# Patient Record
Sex: FEMALE | Race: WHITE | Employment: OTHER | RURAL
[De-identification: names, ages, dates, MRNs, and addresses within clinical notes are randomized per-mention and may not be internally consistent; named-entity substitution may affect disease eponyms.]

---

## 2019-11-11 ENCOUNTER — OFFICE VISIT (OUTPATIENT)
Dept: SURGERY | Age: 64
End: 2019-11-11

## 2019-11-11 VITALS
TEMPERATURE: 98.4 F | SYSTOLIC BLOOD PRESSURE: 156 MMHG | BODY MASS INDEX: 46.22 KG/M2 | RESPIRATION RATE: 18 BRPM | OXYGEN SATURATION: 95 % | DIASTOLIC BLOOD PRESSURE: 86 MMHG | WEIGHT: 251.2 LBS | HEART RATE: 114 BPM | HEIGHT: 62 IN

## 2019-11-11 DIAGNOSIS — K62.5 HEMORRHAGE OF RECTUM AND ANUS: Primary | ICD-10-CM

## 2019-11-11 DIAGNOSIS — K59.00 CONSTIPATION, UNSPECIFIED CONSTIPATION TYPE: ICD-10-CM

## 2019-11-11 NOTE — PROGRESS NOTES
1. Have you been to the ER, urgent care clinic since your last visit? Hospitalized since your last visit? No    2. Have you seen or consulted any other health care providers outside of the [address] [place] [address] since your last visit? Include any pap smears or colon screening.  Yes When: april perez, dr Job Hearn pcp

## 2019-11-11 NOTE — PATIENT INSTRUCTIONS
Learning About Colonoscopy  What is a colonoscopy? A colonoscopy is a test (also called a procedure) that lets a doctor look inside your large intestine. The doctor uses a thin, lighted tube called a colonoscope. The doctor uses it to look for small growths called polyps, colon or rectal cancer (colorectal cancer), or other problems like bleeding. During the procedure, the doctor can take samples of tissue. The samples can then be checked for cancer or other conditions. The doctor can also take out polyps. How is a colonoscopy done? This procedure is done in a doctor's office or a clinic or hospital. You will get medicine to help you relax and not feel pain. Some people find that they do not remember having the test because of the medicine. The doctor gently moves the colonoscope, or scope, through the colon. The scope is also a small video camera. It lets the doctor see the colon and take pictures. A colonoscopy usually takes 30 to 45 minutes. It may take longer if the doctor has to remove polyps. How do you prepare for the procedure? You need to clean out your colon before the procedure so the doctor can see all of your colon. You may start the cleaning process a day or two before the test. This depends on which \"colon prep\" your doctor recommends. To clean your colon, you stop eating solid foods and drink only clear liquids. You can have water, tea, coffee, clear juices, clear broths, flavored ice pops, and gelatin (such as Jell-O). Do not drink anything red or purple, such as grape juice or fruit punch. And do not eat red or purple foods, such as grape ice pops or cherry gelatin. The day or night before the procedure, you drink a large amount of a special liquid. This causes loose, frequent stools. You will go to the bathroom a lot. It is very important to drink all of the colon prep liquid. If you have problems drinking the liquid, call your doctor.   For many people, the prep is worse than the test. It may be uncomfortable, and you may feel hungry on the clear liquid diet. Some people do not go to work or do their usual activities on the day of the prep. Arrange to have someone take you home after the test.  What can you expect after a colonoscopy? The nurses will watch you for 1 to 2 hours until the medicines wear off. Then you can go home. You will need a ride. Your doctor will tell you when you can eat and do your usual activities. Your doctor will talk to you about when you will need your next colonoscopy. The results of your test and your risk for colorectal cancer will help your doctor decide how often you need to be checked. Follow-up care is a key part of your treatment and safety. Be sure to make and go to all appointments, and call your doctor if you are having problems. It's also a good idea to know your test results and keep a list of the medicines you take. Where can you learn more? Go to http://bubba-kelby.info/. Enter G191 in the search box to learn more about \"Learning About Colonoscopy. \"  Current as of: December 19, 2018  Content Version: 12.2  © 6489-8568 [employer]. Care instructions adapted under license by [employer] (which disclaims liability or warranty for this information). If you have questions about a medical condition or this instruction, always ask your healthcare professional. [address] any warranty or liability for your use of this information.

## 2019-11-11 NOTE — PROGRESS NOTES
Shona Otto is a 62 y.o. female who presents today with the following:  Chief Complaint   Patient presents with    Constipation       HPI    72-year-old female who presents to us as a self-referral whose primary care physician is Dr. Chico Amin for worsening chronic constipation and bright red blood per rectum which is been present for approximately 1-1/2 months. In terms of the constipation she states that this is been present over her whole life but is gotten worse over the last 4 to 6 months. She does take a generic stool softener but has not tried any other treatment. She denies any weight change or family history of colon cancer. She had her last colonoscopy in 2011 at which point they found 2 polyps. She denies any abdominal pain. She states that the blood that she notices when she wipes on the toilet tissue and it is bright red with some mild discomfort. She has had a bilateral tubal ligation and a liver biopsy for a liver problem that she had in 1996 that she states has resolved. She is also had bilateral carpal tunnel surgery and left foot surgery with hardware. She does take an 81 mg aspirin a day.   Past Medical History:   Diagnosis Date    Asthma     Hepatitis     auto immune    Menopause     SVT (supraventricular tachycardia) (HCC)        Past Surgical History:   Procedure Laterality Date    HX BREAST BIOPSY Left 2/2009    abscessed tissue    HX CARPAL TUNNEL RELEASE Bilateral     HX COLONOSCOPY  12/2011    adenomatous polyps x 2    HX ORTHOPAEDIC Left 2006    foot       Social History     Socioeconomic History    Marital status:      Spouse name: Not on file    Number of children: Not on file    Years of education: Not on file    Highest education level: Not on file   Occupational History    Not on file   Social Needs    Financial resource strain: Not on file    Food insecurity:     Worry: Not on file     Inability: Not on file    Transportation needs: Medical: Not on file     Non-medical: Not on file   Tobacco Use    Smoking status: Never Smoker    Smokeless tobacco: Never Used   Substance and Sexual Activity    Alcohol use: No    Drug use: No    Sexual activity: Not on file   Lifestyle    Physical activity:     Days per week: Not on file     Minutes per session: Not on file    Stress: Not on file   Relationships    Social connections:     Talks on phone: Not on file     Gets together: Not on file     Attends Anabaptism service: Not on file     Active member of club or organization: Not on file     Attends meetings of clubs or organizations: Not on file     Relationship status: Not on file    Intimate partner violence:     Fear of current or ex partner: Not on file     Emotionally abused: Not on file     Physically abused: Not on file     Forced sexual activity: Not on file   Other Topics Concern    Not on file   Social History Narrative    Not on file       Family History   Problem Relation Age of Onset    Cancer Mother         lung    Cancer Sister         lung       No Known Allergies    Current Outpatient Medications   Medication Sig    dilTIAZem ER (CARDIZEM LA) 240 mg Tb24 tablet Take 1 Tab by mouth daily.  RANITIDINE HCL (ZANTAC PO) Take  by mouth.  ALBUTEROL IN Take  by inhalation.  FLUTICASONE/SALMETEROL (ADVAIR DISKUS IN) Take  by inhalation.  ALPRAZOLAM (XANAX PO) Take  by mouth.  HYDROCODONE/ACETAMINOPHEN (LORTAB PO) Take  by mouth.  ASPIRIN (ELIZABETH LOW STRENGTH PO) Take  by mouth. No current facility-administered medications for this visit. The above histories, medications and allergies have been reviewed. Review of Systems   Respiratory: Positive for wheezing. Gastrointestinal: Positive for blood in stool and constipation. Musculoskeletal: Positive for back pain, joint pain and neck pain. Endo/Heme/Allergies: Bruises/bleeds easily. Psychiatric/Behavioral: The patient is nervous/anxious. Visit Vitals  /86 (BP 1 Location: Left arm, BP Patient Position: Sitting)   Pulse (!) 114   Temp 98.4 °F (36.9 °C) (Oral)   Resp 18   Ht 5' 2\" (1.575 m)   Wt 251 lb 3.2 oz (113.9 kg)   SpO2 95%   BMI 45.95 kg/m²     Physical Exam   Constitutional:   Morbidly obese   Cardiovascular: Normal rate, regular rhythm and normal heart sounds. Pulmonary/Chest: Effort normal. No respiratory distress. She has wheezes. Abdominal: She exhibits no distension and no mass. There is no tenderness. There is no rebound and no guarding. Morbidly obese       1. Hemorrhage of rectum and anus  Recommend colonoscopy. The procedure was explained in detail including the risks and benefits. Risks shared included risks of missed lesions, incomplete exam, colon injury or perforation. Alternative treatments discussed included barium enema. Risks associated with anesthesia were also discussed. The patient wishes to proceed and we will schedule. 2. Constipation, unspecified constipation type  See #1      Follow-up and Dispositions    · Return for post procedure.          Ethel Gates MD

## 2020-07-01 ENCOUNTER — NURSE TRIAGE (OUTPATIENT)
Dept: OTHER | Facility: CLINIC | Age: 65
End: 2020-07-01

## 2020-07-01 NOTE — TELEPHONE ENCOUNTER
Patient's location of employment: [place], [place]  Location of injury: [address]   Time of injury: 0640  Last 4 of patient's SSN: [id number]  [facility]    The wheels on a cart were sticking out and the toe of her shoe caught the wheel and she fell on left knee and left shoulder. She has a bruise on her knee , she did put ice pack on it and denies swelling. The pain is 5/10. Her shoulder pain is getting worse than knee. Her pain is 7/10. When she goes to  something, she feels her arm slightly gives out on her. She has been applying ice. She is still at work. She has full ROM. She has filled out [employer], and notified supervisor. Email: [email or web address],  She will have someone access her work e mail to [email or web address] to her.       Reason for Disposition   High-risk adult (e.g., age > 61, osteoporosis, chronic steroid use)   High-risk adult (e.g., age > 61, osteoporosis, chronic steroid use)    Protocols used: KNEE INJURY-ADULT-OH, SHOULDER INJURY-ADULT-OH

## 2020-07-07 PROBLEM — E66.01 OBESITY, MORBID (HCC): Status: ACTIVE | Noted: 2020-07-07

## 2020-09-18 ENCOUNTER — EMPLOYEE WELLNESS (OUTPATIENT)
Dept: OTHER | Facility: CLINIC | Age: 65
End: 2020-09-18

## 2020-09-19 LAB
CHOLEST SERPL-MCNC: 181 MG/DL
GLUCOSE SERPL-MCNC: 95 MG/DL (ref 65–100)
HDLC SERPL-MCNC: 55 MG/DL
LDLC SERPL CALC-MCNC: 110.6 MG/DL (ref 0–100)
TRIGL SERPL-MCNC: 77 MG/DL (ref ?–150)

## 2021-12-10 ENCOUNTER — HOSPITAL ENCOUNTER (OUTPATIENT)
Dept: MAMMOGRAPHY | Age: 66
Discharge: HOME | End: 2021-12-10
Attending: INTERNAL MEDICINE
Payer: MEDICARE

## 2021-12-10 ENCOUNTER — HOSPITAL ENCOUNTER (OUTPATIENT)
Dept: GENERAL RADIOLOGY | Age: 66
Discharge: HOME | End: 2021-12-10
Attending: INTERNAL MEDICINE
Payer: MEDICARE

## 2021-12-10 VITALS — WEIGHT: 250 LBS | BODY MASS INDEX: 45.73 KG/M2

## 2021-12-10 DIAGNOSIS — Z12.31 ENCOUNTER FOR SCREENING MAMMOGRAM FOR MALIGNANT NEOPLASM OF BREAST: ICD-10-CM

## 2021-12-10 DIAGNOSIS — Z78.0 ASYMPTOMATIC MENOPAUSAL STATE: ICD-10-CM

## 2021-12-10 PROCEDURE — 77063 BREAST TOMOSYNTHESIS BI: CPT

## 2021-12-10 PROCEDURE — 77080 DXA BONE DENSITY AXIAL: CPT

## 2022-03-19 PROBLEM — E66.01 OBESITY, MORBID (HCC): Status: ACTIVE | Noted: 2020-07-07

## 2022-06-28 ENCOUNTER — TRANSCRIBE ORDER (OUTPATIENT)
Dept: REGISTRATION | Age: 67
End: 2022-06-28

## 2022-06-28 ENCOUNTER — HOSPITAL ENCOUNTER (OUTPATIENT)
Dept: GENERAL RADIOLOGY | Age: 67
Discharge: HOME | End: 2022-06-28
Payer: MEDICARE

## 2022-06-28 DIAGNOSIS — M25.569 PAIN IN UNSPECIFIED KNEE: Primary | ICD-10-CM

## 2022-06-28 DIAGNOSIS — M25.569 PAIN IN UNSPECIFIED KNEE: ICD-10-CM

## 2022-06-28 PROCEDURE — 73564 X-RAY EXAM KNEE 4 OR MORE: CPT

## 2022-09-22 ENCOUNTER — APPOINTMENT (OUTPATIENT)
Dept: CT IMAGING | Age: 67
End: 2022-09-22
Attending: EMERGENCY MEDICINE
Payer: MEDICARE

## 2022-09-22 ENCOUNTER — HOSPITAL ENCOUNTER (EMERGENCY)
Age: 67
Discharge: ANOTHER HOSPITAL | End: 2022-09-23
Attending: EMERGENCY MEDICINE
Payer: MEDICARE

## 2022-09-22 DIAGNOSIS — A41.9 SEPSIS, DUE TO UNSPECIFIED ORGANISM, UNSPECIFIED WHETHER ACUTE ORGAN DYSFUNCTION PRESENT (HCC): ICD-10-CM

## 2022-09-22 DIAGNOSIS — K81.9 CHOLECYSTITIS: Primary | ICD-10-CM

## 2022-09-22 LAB
ALBUMIN SERPL-MCNC: 3.3 G/DL (ref 3.5–5)
ALBUMIN/GLOB SERPL: 0.8 {RATIO} (ref 1.1–2.2)
ALP SERPL-CCNC: 83 U/L (ref 45–117)
ALT SERPL-CCNC: 27 U/L (ref 12–78)
ANION GAP SERPL CALC-SCNC: 9 MMOL/L (ref 5–15)
AST SERPL-CCNC: 25 U/L (ref 15–37)
BASOPHILS # BLD: 0 K/UL (ref 0–0.1)
BASOPHILS NFR BLD: 0 % (ref 0–1)
BILIRUB SERPL-MCNC: 2 MG/DL (ref 0.2–1)
BUN SERPL-MCNC: 10 MG/DL (ref 6–20)
BUN/CREAT SERPL: 13 (ref 12–20)
CALCIUM SERPL-MCNC: 9.2 MG/DL (ref 8.5–10.1)
CHLORIDE SERPL-SCNC: 100 MMOL/L (ref 97–108)
CO2 SERPL-SCNC: 25 MMOL/L (ref 21–32)
CREAT SERPL-MCNC: 0.8 MG/DL (ref 0.55–1.02)
DIFFERENTIAL METHOD BLD: ABNORMAL
EOSINOPHIL # BLD: 0 K/UL (ref 0–0.4)
EOSINOPHIL NFR BLD: 0 % (ref 0–7)
ERYTHROCYTE [DISTWIDTH] IN BLOOD BY AUTOMATED COUNT: 18.5 % (ref 11.5–14.5)
GLOBULIN SER CALC-MCNC: 4 G/DL (ref 2–4)
GLUCOSE SERPL-MCNC: 114 MG/DL (ref 65–100)
HCT VFR BLD AUTO: 33.6 % (ref 35–47)
HGB BLD-MCNC: 10.2 G/DL (ref 11.5–16)
IMM GRANULOCYTES # BLD AUTO: 0 K/UL (ref 0–0.04)
IMM GRANULOCYTES NFR BLD AUTO: 0 % (ref 0–0.5)
INR PPP: 1.1 (ref 0.9–1.1)
LACTATE SERPL-SCNC: 1.5 MMOL/L (ref 0.4–2)
LIPASE SERPL-CCNC: 21 U/L (ref 73–393)
LYMPHOCYTES # BLD: 0.3 K/UL (ref 0.8–3.5)
LYMPHOCYTES NFR BLD: 2 % (ref 12–49)
MCH RBC QN AUTO: 23.4 PG (ref 26–34)
MCHC RBC AUTO-ENTMCNC: 30.4 G/DL (ref 30–36.5)
MCV RBC AUTO: 77.2 FL (ref 80–99)
MONOCYTES # BLD: 0.3 K/UL (ref 0–1)
MONOCYTES NFR BLD: 2 % (ref 5–13)
NEUTS BAND NFR BLD MANUAL: 3 %
NEUTS SEG # BLD: 13.4 K/UL (ref 1.8–8)
NEUTS SEG NFR BLD: 93 % (ref 32–75)
NRBC # BLD: 0.02 K/UL (ref 0–0.01)
NRBC BLD-RTO: 0.1 PER 100 WBC
PLATELET # BLD AUTO: 380 K/UL (ref 150–400)
PLATELET COMMENTS,PCOM: ABNORMAL
PMV BLD AUTO: 9.7 FL (ref 8.9–12.9)
POTASSIUM SERPL-SCNC: 4.1 MMOL/L (ref 3.5–5.1)
PROT SERPL-MCNC: 7.3 G/DL (ref 6.4–8.2)
PROTHROMBIN TIME: 11.1 SEC (ref 9–11.1)
RBC # BLD AUTO: 4.35 M/UL (ref 3.8–5.2)
RBC MORPH BLD: ABNORMAL
SODIUM SERPL-SCNC: 134 MMOL/L (ref 136–145)
WBC # BLD AUTO: 14 K/UL (ref 3.6–11)

## 2022-09-22 PROCEDURE — 74011000258 HC RX REV CODE- 258: Performed by: EMERGENCY MEDICINE

## 2022-09-22 PROCEDURE — 74011250636 HC RX REV CODE- 250/636: Performed by: EMERGENCY MEDICINE

## 2022-09-22 PROCEDURE — 74177 CT ABD & PELVIS W/CONTRAST: CPT

## 2022-09-22 PROCEDURE — 36415 COLL VENOUS BLD VENIPUNCTURE: CPT

## 2022-09-22 PROCEDURE — 74011000636 HC RX REV CODE- 636: Performed by: EMERGENCY MEDICINE

## 2022-09-22 PROCEDURE — 96361 HYDRATE IV INFUSION ADD-ON: CPT

## 2022-09-22 PROCEDURE — 85610 PROTHROMBIN TIME: CPT

## 2022-09-22 PROCEDURE — 96375 TX/PRO/DX INJ NEW DRUG ADDON: CPT

## 2022-09-22 PROCEDURE — 96365 THER/PROPH/DIAG IV INF INIT: CPT

## 2022-09-22 PROCEDURE — 85025 COMPLETE CBC W/AUTO DIFF WBC: CPT

## 2022-09-22 PROCEDURE — 83690 ASSAY OF LIPASE: CPT

## 2022-09-22 PROCEDURE — 83605 ASSAY OF LACTIC ACID: CPT

## 2022-09-22 PROCEDURE — 80053 COMPREHEN METABOLIC PANEL: CPT

## 2022-09-22 PROCEDURE — 99285 EMERGENCY DEPT VISIT HI MDM: CPT

## 2022-09-22 RX ORDER — MORPHINE SULFATE 4 MG/ML
4 INJECTION INTRAVENOUS
Status: COMPLETED | OUTPATIENT
Start: 2022-09-22 | End: 2022-09-22

## 2022-09-22 RX ORDER — ONDANSETRON 2 MG/ML
4 INJECTION INTRAMUSCULAR; INTRAVENOUS
Status: COMPLETED | OUTPATIENT
Start: 2022-09-22 | End: 2022-09-22

## 2022-09-22 RX ADMIN — SODIUM CHLORIDE 1000 ML: 9 INJECTION, SOLUTION INTRAVENOUS at 20:21

## 2022-09-22 RX ADMIN — MORPHINE SULFATE 4 MG: 4 INJECTION INTRAVENOUS at 20:23

## 2022-09-22 RX ADMIN — IOPAMIDOL 100 ML: 612 INJECTION, SOLUTION INTRAVENOUS at 19:54

## 2022-09-22 RX ADMIN — PIPERACILLIN AND TAZOBACTAM 3.38 G: 3; .375 INJECTION, POWDER, FOR SOLUTION INTRAVENOUS at 20:55

## 2022-09-22 RX ADMIN — ONDANSETRON 4 MG: 2 INJECTION INTRAMUSCULAR; INTRAVENOUS at 20:22

## 2022-09-22 NOTE — ED TRIAGE NOTES
Pt arrived by POV with abd pain. Per pt she complains of abd pain that started on Tuesday pt reports normal BM no N/V/D, pt reports her pain is intermittent rating at 4/10. Pt is awake alert and oriented X 4.  Pt educated on ER flow

## 2022-09-23 ENCOUNTER — HOSPITAL ENCOUNTER (INPATIENT)
Age: 67
LOS: 4 days | Discharge: HOME | DRG: 418 | End: 2022-09-27
Attending: FAMILY MEDICINE | Admitting: FAMILY MEDICINE
Payer: MEDICARE

## 2022-09-23 ENCOUNTER — APPOINTMENT (OUTPATIENT)
Dept: ULTRASOUND IMAGING | Age: 67
DRG: 418 | End: 2022-09-23
Attending: FAMILY MEDICINE
Payer: MEDICARE

## 2022-09-23 VITALS
TEMPERATURE: 99 F | SYSTOLIC BLOOD PRESSURE: 145 MMHG | OXYGEN SATURATION: 95 % | BODY MASS INDEX: 47.84 KG/M2 | HEIGHT: 62 IN | RESPIRATION RATE: 16 BRPM | HEART RATE: 104 BPM | DIASTOLIC BLOOD PRESSURE: 74 MMHG | WEIGHT: 260 LBS

## 2022-09-23 PROBLEM — K81.9 CHOLECYSTITIS: Status: ACTIVE | Noted: 2022-09-23

## 2022-09-23 LAB
ALBUMIN SERPL-MCNC: 2.8 G/DL (ref 3.5–5)
ALBUMIN/GLOB SERPL: 0.7 {RATIO} (ref 1.1–2.2)
ALP SERPL-CCNC: 78 U/L (ref 45–117)
ALT SERPL-CCNC: 23 U/L (ref 12–78)
ANION GAP SERPL CALC-SCNC: 8 MMOL/L (ref 5–15)
APPEARANCE UR: CLEAR
AST SERPL-CCNC: 21 U/L (ref 15–37)
BACTERIA URNS QL MICRO: ABNORMAL /HPF
BILIRUB DIRECT SERPL-MCNC: 0.5 MG/DL (ref 0–0.2)
BILIRUB SERPL-MCNC: 1.9 MG/DL (ref 0.2–1)
BILIRUB UR QL: NEGATIVE
BUN SERPL-MCNC: 11 MG/DL (ref 6–20)
BUN/CREAT SERPL: 14 (ref 12–20)
CALCIUM SERPL-MCNC: 8.4 MG/DL (ref 8.5–10.1)
CHLORIDE SERPL-SCNC: 103 MMOL/L (ref 97–108)
CO2 SERPL-SCNC: 25 MMOL/L (ref 21–32)
COLOR UR: ABNORMAL
CREAT SERPL-MCNC: 0.81 MG/DL (ref 0.55–1.02)
EPITH CASTS URNS QL MICRO: ABNORMAL /LPF
ERYTHROCYTE [DISTWIDTH] IN BLOOD BY AUTOMATED COUNT: 18.6 % (ref 11.5–14.5)
GLOBULIN SER CALC-MCNC: 3.8 G/DL (ref 2–4)
GLUCOSE SERPL-MCNC: 100 MG/DL (ref 65–100)
GLUCOSE UR STRIP.AUTO-MCNC: NEGATIVE MG/DL
HCT VFR BLD AUTO: 30.4 % (ref 35–47)
HGB BLD-MCNC: 9 G/DL (ref 11.5–16)
HGB UR QL STRIP: ABNORMAL
KETONES UR QL STRIP.AUTO: NEGATIVE MG/DL
LEUKOCYTE ESTERASE UR QL STRIP.AUTO: NEGATIVE
MCH RBC QN AUTO: 23.2 PG (ref 26–34)
MCHC RBC AUTO-ENTMCNC: 29.6 G/DL (ref 30–36.5)
MCV RBC AUTO: 78.4 FL (ref 80–99)
NITRITE UR QL STRIP.AUTO: NEGATIVE
NRBC # BLD: 0 K/UL (ref 0–0.01)
NRBC BLD-RTO: 0 PER 100 WBC
PH UR STRIP: 6 [PH] (ref 5–8)
PLATELET # BLD AUTO: 348 K/UL (ref 150–400)
PMV BLD AUTO: 9.6 FL (ref 8.9–12.9)
POTASSIUM SERPL-SCNC: 3.9 MMOL/L (ref 3.5–5.1)
PROT SERPL-MCNC: 6.6 G/DL (ref 6.4–8.2)
PROT UR STRIP-MCNC: ABNORMAL MG/DL
RBC # BLD AUTO: 3.88 M/UL (ref 3.8–5.2)
RBC #/AREA URNS HPF: ABNORMAL /HPF (ref 0–5)
SODIUM SERPL-SCNC: 136 MMOL/L (ref 136–145)
SP GR UR REFRACTOMETRY: 1.02 (ref 1–1.03)
UROBILINOGEN UR QL STRIP.AUTO: 1 EU/DL (ref 0.2–1)
WBC # BLD AUTO: 13 K/UL (ref 3.6–11)
WBC URNS QL MICRO: ABNORMAL /HPF (ref 0–4)

## 2022-09-23 PROCEDURE — 36415 COLL VENOUS BLD VENIPUNCTURE: CPT

## 2022-09-23 PROCEDURE — 82248 BILIRUBIN DIRECT: CPT

## 2022-09-23 PROCEDURE — 96361 HYDRATE IV INFUSION ADD-ON: CPT

## 2022-09-23 PROCEDURE — 74011250636 HC RX REV CODE- 250/636: Performed by: FAMILY MEDICINE

## 2022-09-23 PROCEDURE — 76705 ECHO EXAM OF ABDOMEN: CPT

## 2022-09-23 PROCEDURE — 94640 AIRWAY INHALATION TREATMENT: CPT

## 2022-09-23 PROCEDURE — 80053 COMPREHEN METABOLIC PANEL: CPT

## 2022-09-23 PROCEDURE — 74011000258 HC RX REV CODE- 258: Performed by: EMERGENCY MEDICINE

## 2022-09-23 PROCEDURE — 99221 1ST HOSP IP/OBS SF/LOW 40: CPT | Performed by: NURSE PRACTITIONER

## 2022-09-23 PROCEDURE — 74011250636 HC RX REV CODE- 250/636: Performed by: EMERGENCY MEDICINE

## 2022-09-23 PROCEDURE — 81001 URINALYSIS AUTO W/SCOPE: CPT

## 2022-09-23 PROCEDURE — 96366 THER/PROPH/DIAG IV INF ADDON: CPT

## 2022-09-23 PROCEDURE — 74011000250 HC RX REV CODE- 250: Performed by: FAMILY MEDICINE

## 2022-09-23 PROCEDURE — 94664 DEMO&/EVAL PT USE INHALER: CPT

## 2022-09-23 PROCEDURE — 65270000029 HC RM PRIVATE

## 2022-09-23 PROCEDURE — 85027 COMPLETE CBC AUTOMATED: CPT

## 2022-09-23 RX ORDER — HEPARIN SODIUM 5000 [USP'U]/ML
5000 INJECTION, SOLUTION INTRAVENOUS; SUBCUTANEOUS EVERY 8 HOURS
Status: DISCONTINUED | OUTPATIENT
Start: 2022-09-24 | End: 2022-09-27 | Stop reason: HOSPADM

## 2022-09-23 RX ORDER — METRONIDAZOLE 500 MG/100ML
500 INJECTION, SOLUTION INTRAVENOUS EVERY 12 HOURS
Status: DISCONTINUED | OUTPATIENT
Start: 2022-09-23 | End: 2022-09-27 | Stop reason: HOSPADM

## 2022-09-23 RX ORDER — BUDESONIDE 0.25 MG/2ML
500 INHALANT ORAL
Status: DISCONTINUED | OUTPATIENT
Start: 2022-09-23 | End: 2022-09-27 | Stop reason: HOSPADM

## 2022-09-23 RX ORDER — ACETAMINOPHEN 325 MG/1
650 TABLET ORAL
Status: DISCONTINUED | OUTPATIENT
Start: 2022-09-23 | End: 2022-09-23

## 2022-09-23 RX ORDER — METRONIDAZOLE 500 MG/100ML
500 INJECTION, SOLUTION INTRAVENOUS EVERY 8 HOURS
Status: DISCONTINUED | OUTPATIENT
Start: 2022-09-23 | End: 2022-09-23

## 2022-09-23 RX ORDER — DEXTROSE, SODIUM CHLORIDE, AND POTASSIUM CHLORIDE 5; .45; .22 G/100ML; G/100ML; G/100ML
INJECTION INTRAVENOUS CONTINUOUS
Status: DISPENSED | OUTPATIENT
Start: 2022-09-23 | End: 2022-09-24

## 2022-09-23 RX ORDER — SODIUM CHLORIDE, SODIUM LACTATE, POTASSIUM CHLORIDE, CALCIUM CHLORIDE 600; 310; 30; 20 MG/100ML; MG/100ML; MG/100ML; MG/100ML
125 INJECTION, SOLUTION INTRAVENOUS CONTINUOUS
Status: DISCONTINUED | OUTPATIENT
Start: 2022-09-23 | End: 2022-09-23 | Stop reason: HOSPADM

## 2022-09-23 RX ORDER — ARFORMOTEROL TARTRATE 15 UG/2ML
15 SOLUTION RESPIRATORY (INHALATION)
Status: DISCONTINUED | OUTPATIENT
Start: 2022-09-23 | End: 2022-09-27 | Stop reason: HOSPADM

## 2022-09-23 RX ORDER — MORPHINE SULFATE 2 MG/ML
2 INJECTION, SOLUTION INTRAMUSCULAR; INTRAVENOUS
Status: DISCONTINUED | OUTPATIENT
Start: 2022-09-23 | End: 2022-09-25

## 2022-09-23 RX ORDER — HYDROCODONE BITARTRATE AND ACETAMINOPHEN 5; 325 MG/1; MG/1
1 TABLET ORAL
Status: DISCONTINUED | OUTPATIENT
Start: 2022-09-23 | End: 2022-09-25

## 2022-09-23 RX ORDER — ALPRAZOLAM 0.5 MG/1
0.5 TABLET ORAL
Status: DISCONTINUED | OUTPATIENT
Start: 2022-09-23 | End: 2022-09-27 | Stop reason: HOSPADM

## 2022-09-23 RX ADMIN — ARFORMOTEROL TARTRATE 15 MCG: 15 SOLUTION RESPIRATORY (INHALATION) at 19:23

## 2022-09-23 RX ADMIN — PIPERACILLIN AND TAZOBACTAM 3.38 G: 3; .375 INJECTION, POWDER, FOR SOLUTION INTRAVENOUS at 09:03

## 2022-09-23 RX ADMIN — SODIUM CHLORIDE, POTASSIUM CHLORIDE, SODIUM LACTATE AND CALCIUM CHLORIDE 125 ML/HR: 600; 310; 30; 20 INJECTION, SOLUTION INTRAVENOUS at 06:12

## 2022-09-23 RX ADMIN — POTASSIUM CHLORIDE, DEXTROSE MONOHYDRATE AND SODIUM CHLORIDE: 224; 5; 450 INJECTION, SOLUTION INTRAVENOUS at 15:23

## 2022-09-23 RX ADMIN — CEFTRIAXONE SODIUM 1 G: 1 INJECTION, POWDER, FOR SOLUTION INTRAMUSCULAR; INTRAVENOUS at 15:24

## 2022-09-23 RX ADMIN — METRONIDAZOLE 500 MG: 500 INJECTION, SOLUTION INTRAVENOUS at 15:23

## 2022-09-23 RX ADMIN — PIPERACILLIN AND TAZOBACTAM 3.38 G: 3; .375 INJECTION, POWDER, FOR SOLUTION INTRAVENOUS at 04:26

## 2022-09-23 RX ADMIN — BUDESONIDE 500 MCG: 0.25 INHALANT RESPIRATORY (INHALATION) at 19:23

## 2022-09-23 NOTE — CONSULTS
[facility]  Inpatient Consultation        Admit Date: 9/23/2022  Reason for Consultation: cholecystitis    HPI:  Ted De Jesus is a 78 y.o. female w/ hx of cirrhosis d/t autoimmune hepatitis since the 90s whom we are asked to see in consultation by Dr. Noris Perdue for the above complaint. Pt presented to an OSH [facility]) w/ c/o RUQ pain that started 3 days ago. She thought it was gas so took pepto bismol w/o a lot of relief. Denies n/v/d. No f/c. Wbc 13K, T bili 1.9, LFTs are WNL    CT scan:  1. Cirrhosis is suspected. No hepatic mass, intrahepatic or ureteral dilation or  extrahepatic bile duct dilation. 2. At least 2 gallstones in the gallbladder neck, the larger measuring 18 mm. Gallbladder mildly distended. Patient Active Problem List    Diagnosis Date Noted    Cholecystitis 09/23/2022    Obesity, morbid ([facility] [address] 07/07/2020    History of colonic polyps 11/26/2013     Past Medical History:   Diagnosis Date    Asthma     Hepatitis     auto immune    Menopause     SVT (supraventricular tachycardia) ([facility] [address]       Past Surgical History:   Procedure Laterality Date    COLONOSCOPY N/A 12/12/2019    COLONOSCOPY performed by Matthew Powers MD at [facility]    HX BREAST BIOPSY Left 2/2009    abscessed tissue    HX CARPAL TUNNEL RELEASE Bilateral     HX CATARACT REMOVAL Bilateral     HX COLONOSCOPY  12/2011    adenomatous polyps x 2    HX ORTHOPAEDIC Left 2006    foot      Social History     Tobacco Use    Smoking status: Never    Smokeless tobacco: Never   Substance Use Topics    Alcohol use: No      Family History   Problem Relation Age of Onset    Cancer Mother         lung    Cancer Sister         lung      Prior to Admission medications    Medication Sig Start Date End Date Taking? Authorizing Provider   fluticasone propionate (Flovent Diskus) 100 mcg/actuation dsdv Take  by inhalation.     Provider, Historical   cyclobenzaprine (FLEXERIL) 10 mg tablet TAKE 1 TABLET BY MOUTH 3 TIMES A DAY AS NEEDED 19   Provider, Historical   predniSONE (DELTASONE) 10 mg tablet TAKE 2 TABLETS BY MOUTH EVERY DAY AS NEEDED 10/3/19   Provider, Historical   ALBUTEROL IN Take  by inhalation. Provider, Historical   FLUTICASONE/SALMETEROL (ADVAIR DISKUS IN) Take  by inhalation. Provider, Historical   ALPRAZOLAM (XANAX PO) Take  by mouth. Provider, Historical   HYDROCODONE/ACETAMINOPHEN (LORTAB PO) Take  by mouth. Provider, Historical   ASPIRIN (ELIZABETH LOW STRENGTH PO) Take  by mouth. Provider, Historical     Current Facility-Administered Medications   Medication Dose Route Frequency    cefTRIAXone (ROCEPHIN) 1 g in 0.9% sodium chloride 10 mL IV syringe  1 g IntraVENous Q24H    morphine injection 2 mg  2 mg IntraVENous Q4H PRN    dextrose 5% - 0.45% NaCl with KCl 30 mEq/L infusion   IntraVENous CONTINUOUS    [START ON 2022] heparin (porcine) injection 5,000 Units  5,000 Units SubCUTAneous Q8H    metroNIDAZOLE (FLAGYL) IVPB premix 500 mg  500 mg IntraVENous Q12H    ALPRAZolam (XANAX) tablet 0.5 mg  0.5 mg Oral BID PRN    HYDROcodone-acetaminophen (NORCO) 5-325 mg per tablet 1 Tablet  1 Tablet Oral Q6H PRN    arformoteroL (BROVANA) neb solution 15 mcg  15 mcg Nebulization BID RT    And    budesonide (PULMICORT) 250 mcg/2ml nebulizer susp  500 mcg Nebulization BID RT     No Known Allergies       Subjective:     Review of Systems:    A comprehensive review of systems was negative except for that written in the History of Present Illness. Objective:     Blood pressure (!) 145/95, pulse 99, temperature 98.8 °F (37.1 °C), resp. rate 14, SpO2 95 %.   Temp (24hrs), Av.8 °F (37.1 °C), Min:98.4 °F (36.9 °C), Max:99 °F (37.2 °C)      Recent Labs     22  0500 22  1840   WBC 13.0* 14.0*   HGB 9.0* 10.2*   HCT 30.4* 33.6*    380     Recent Labs     22  0500 22  1840    134*   K 3.9 4.1    100   CO2 25 25    114*   BUN 11 10   CREA 0.81 0.80   CA 8.4* 9. 2   ALB 2.8* 3.3*   TBILI 1.9* 2.0*   ALT 23 27   INR  --  1.1     Recent Labs     09/22/22  1840   LPSE 21*       No intake or output data in the 24 hours ending 09/23/22 1533    _____________________  Physical Exam:     General:  Alert, cooperative, no distress, appears stated age. Eyes:   Sclera clear. Throat: Lips, mucosa, and tongue normal.   Neck: Supple, symmetrical, trachea midline. Lungs:   Clear to auscultation bilaterally. Heart:  Regular rate and rhythm. Abdomen:   Normal BS, obese, TTP RUQ, No masses,  No organomegaly. Extremities: Extremities normal, atraumatic, no cyanosis or edema. Skin: Skin color, texture, turgor normal. No rashes or lesions. Assessment:   Active Problems:    Cholecystitis (9/23/2022)            Plan:     Lap marta sometime this weekend - Dr Cinda Amezquita or Dr Candi Diane  Will check direct bili - to make sure elevation is d/t hepatitis  May have clear liquids  Pain mgmt    Thank you for allowing us to participate in the care of this patient. Total time spent with patient: 30 minutes. Signed By: Danelle Jones NP     September 23, 2022        ADMIT NOTE    Attending addendum:  I was available to supervise the APC. I have reviewed the APC's note  We discussed the plan of care. 71-year-old female with right upper quadrant pain and a history of autoimmune hepatitis no longer on therapy.     OBJECTIVE:  Visit Vitals  BP (!) 145/95   Pulse 99   Temp 98.8 °F (37.1 °C)   Resp 14   SpO2 95%     General: No acute distress, conversant  Eyes: PERRLA, no scleral icterus  HENT: Normocephalic without oral lesions  Neck: Trachea midline without LAD  Cardiac: Normal pulse rate and rhythm  Pulmonary: Symmetric chest rise with normal effort  GI: Soft, tender in right upper quadrant, obese, ND, no hernia, no splenomegaly  Skin: Warm without rash  Extremities: No edema or joint stiffness  Psych: Appropriate mood and affect    ASSESSMENT / Plan:    I agree with the APC's assessment and plan with the following additions/modifications:    61-year-old female with right upper quadrant pain and gallstones with gallbladder distention but no wall thickening or surrounding fluid. White count elevated. Direct bilirubin low. Appears to be indirect dominated. Thirsty. We will give clear liquid diet. I will hold off on MRI given the low likelihood this is a CBD stone. Recommend IOC if we proceed with removal of her gallbladder. We will check an acute hepatitis panel tonight. LFTs otherwise normal.  May need her gallbladder out in the next day or 2. Continue antibiotics.       Signed By: Carolyn Foreman MD  Bariatric and General Surgeon  [facility]    September 23, 2022

## 2022-09-23 NOTE — H&P
[facility] Adult  Hospitalist Group  History and Physical    Date of Service:  9/23/2022  Primary Care Provider: Lucille Shook MD  Source of information: The patient    Chief Complaint: No chief complaint on file. History of Presenting Illness:   Gabriella Perez is a 78 y.o. female who presents with abdominal pain. Patient reports abdominal pain is started on Tuesday evening. Moderate pain. She thought it was a gas, took over-the-counter medication no improvement. Next days feeling the same went to emergency room, had CT scan of abdomen diagnosed with gallstone disease. She was advised needs cholecystectomy. Transferred here for further evaluation and treatment. Patient reports in 1996 was diagnosed with cirrhosis, had elevated liver enzyme but normalized. No history of EtOH. Upon arrival in the emergency room CT scan of abdomen showed gallbladder with wall thickness, likely cirrhosis and gallstone. Patient reports taking medication for asthma anxiety she has osteoarthritis taking Lortab. Taking verapamil 120 mg for rapid heart rate. The patient denies any headache, blurry vision, sore throat, trouble swallowing, trouble with speech, chest pain, SOB, cough, fever, chills, N/V/D, abd pain, urinary symptoms, constipation, recent travels, sick contacts, focal or generalized neurological symptoms, falls, injuries, rashes, contact with COVID-19 diagnosed patients, hematemesis, melena, hemoptysis, hematuria, rashes, denies starting any new medications and denies any other concerns or problems besides as mentioned above. REVIEW OF SYSTEMS:  A comprehensive review of systems was negative except for that written in the History of Present Illness.      Past Medical History:   Diagnosis Date    Asthma     Hepatitis     auto immune    Menopause     SVT (supraventricular tachycardia) ([facility] [address]       Past Surgical History:   Procedure Laterality Date    COLONOSCOPY N/A 12/12/2019 COLONOSCOPY performed by Dalia Vu MD at [address] Left 2/2009    abscessed tissue    HX CARPAL TUNNEL RELEASE Bilateral     HX CATARACT REMOVAL Bilateral     HX COLONOSCOPY  12/2011    adenomatous polyps x 2    HX ORTHOPAEDIC Left 2006    foot     Prior to Admission medications    Medication Sig Start Date End Date Taking? Authorizing Provider   fluticasone propionate (Flovent Diskus) 100 mcg/actuation dsdv Take  by inhalation. Provider, Historical   cyclobenzaprine (FLEXERIL) 10 mg tablet TAKE 1 TABLET BY MOUTH 3 TIMES A DAY AS NEEDED 9/6/19   Provider, Historical   predniSONE (DELTASONE) 10 mg tablet TAKE 2 TABLETS BY MOUTH EVERY DAY AS NEEDED 10/3/19   Provider, Historical   ALBUTEROL IN Take  by inhalation. Provider, Historical   FLUTICASONE/SALMETEROL (ADVAIR DISKUS IN) Take  by inhalation. Provider, Historical   ALPRAZOLAM (XANAX PO) Take  by mouth. Provider, Historical   HYDROCODONE/ACETAMINOPHEN (LORTAB PO) Take  by mouth. Provider, Historical   ASPIRIN (ELIZABETH LOW STRENGTH PO) Take  by mouth. Provider, Historical     No Known Allergies   Family History   Problem Relation Age of Onset    Cancer Mother         lung    Cancer Sister         lung      Social History:  reports that she has never smoked. She has never used smokeless tobacco. She reports that she does not drink alcohol and does not use drugs. Family and social history were personally reviewed, all pertinent and relevant details are outlined as above.   FH Lung cancer sister and mother     Objective:   Visit Vitals  BP (!) 145/95 (BP 1 Location: Right upper arm)   Pulse 100   Temp 98.8 °F (37.1 °C)   Resp 14   SpO2 95%           PHYSICAL EXAM:   General: Alert x oriented x 3, awake, no acute distress, resting in bed, pleasant female appears to be stated age  HEENT: PEERL, EOMI, moist mucus membranes  Neck: Supple, no JVD, no meningeal signs  Chest: Clear to auscultation bilaterally   CVS: RRR, S1 S2 heard, no murmurs/rubs/gallops  Abd: Abdomen soft. Deep tenderness upper abdomen more to the epigastric and right upper quadrant no rebound tenderness. Ext: No clubbing, no cyanosis, no edema  Neuro/Psych: Pleasant mood and affect, CN 2-12 grossly intact, sensory grossly within normal limit, Strength 5/5 in all extremities, DTR 1+ x 4  Cap refill: Brisk, less than 3 seconds  Pulses: 2+, symmetric in all extremities  Skin: Warm, dry, without rashes or lesions    Data Review: All diagnostic labs and studies have been reviewed. Abnormal Labs Reviewed - No data to display    All Micro Results       None            IMAGING:   US ABD LTD    (Results Pending)        ECG/ECHO:    Results for orders placed or performed during the hospital encounter of 05/07/18   EKG, 12 LEAD, INITIAL   Result Value Ref Range    Ventricular Rate 166 BPM    Atrial Rate 208 BPM    QRS Duration 114 ms    Q-T Interval 302 ms    QTC Calculation (Bezet) 501 ms    Calculated R Axis 96 degrees    Calculated T Axis 15 degrees    Diagnosis       Supraventricular tachycardia  Rightward axis  Nonspecific ST abnormality  Abnormal ECG  When compared with ECG of 23-FEB-2007 12:54,  Note tachycardia and rhythm change  ST now depressed in Inferior leads  Confirmed by Jany Guan MD, --- ([id number]) on 5/8/2018 7:01:38 AM          Assessment:   Given the patient's current clinical presentation, there is a high level of concern for decompensation if discharged from the emergency department. Complex decision making was performed, which includes reviewing the patient's available past medical records, laboratory results, and imaging studies. Active Problems:    Cholecystitis (9/23/2022)      Plan:   1. Abdominal pain  2. Gallstone disease  3. Leukocytosis  4. Asthma  5. History of tachyarrhythmia  6. Anxiety  7. Osteoarthritis  Patient admitted for further evaluation and treatment. We will proceed with ultrasound of right upper quadrant. Empiric antibiotic surgical consultation. IV fluid keep NPO. Pain control. 2.  Leukocytosis due to cholecystitis. Monitor  3. Asthma: Not in acute exacerbation. Resume Advair and albuterol  4. Osteoarthritis: Taking Lortab as needed  5. Patient taking verapamil for rapid heart rate      DIET: No diet orders on file   ISOLATION PRECAUTIONS: There are currently no Active Isolations  CODE STATUS: Full Code   DVT PROPHYLAXIS: Heparin  FUNCTIONAL STATUS PRIOR TO HOSPITALIZATION: Fully active and ambulatory; able to carry on all self-care without restriction. EARLY MOBILITY ASSESSMENT: Recommend routine ambulation while hospitalized with the assistance of nursing staff  ANTICIPATED DISCHARGE: 24-48 hours. EMERGENCY CONTACT/SURROGATE DECISION MAKER: P    CRITICAL CARE WAS PERFORMED FOR THIS ENCOUNTER: NO.      Signed By: Helena Meza MD     September 23, 2022         Please note that this dictation may have been completed with Dragon, the [employer] voice recognition software. Quite often unanticipated grammatical, syntax, homophones, and other interpretive errors are inadvertently transcribed by the computer software. Please disregard these errors. Please excuse any errors that have escaped final proofreading.

## 2022-09-23 NOTE — PROGRESS NOTES
Clinical Pharmacy Note: Metronidazole Dosing    Please note that the metronidazole dose for Emmanuelle Valdez has been changed to 500 mg IV q12h per [facility]-approved protocol. Please contact the pharmacy with any questions.     ANTIONE SearsD

## 2022-09-23 NOTE — ED PROVIDER NOTES
EMERGENCY DEPARTMENT HISTORY AND PHYSICAL EXAM      Date: 9/22/2022  Patient Name: Sandra Roman    History of Presenting Illness     Chief Complaint   Patient presents with    Abdominal Pain       History Provided By: Patient    HPI: Sandra Roman, 78 y.o. female with PMHx as noted below presents the emergency department chief complaint of abdominal pain. Patient reported onset of upper abdominal pain approximately 2 days ago. Initially was intermittent, now become constant and more severe. Pain is described as a dull, aching pain that is nonradiating. She also reports some associated nausea. Pt denies any other alleviating or exacerbating factors. Additionally, pt specifically denies any recent fever, chills, headache,  CP, SOB, lightheadedness, dizziness, numbness, weakness, BLE swelling, heart palpitations, urinary sxs, diarrhea, constipation, melena, hematochezia, cough, or congestion. PCP: Joseph NELSON MD    Current Facility-Administered Medications   Medication Dose Route Frequency Provider Last Rate Last Admin    piperacillin-tazobactam (ZOSYN) 3.375 g in 0.9% sodium chloride (MBP/ADV) 100 mL MBP  3.375 g IntraVENous Q6H Fernandez Beth MD   IV Completed at 09/23/22 0456    lactated Ringers infusion  125 mL/hr IntraVENous CONTINUOUS Fernandez Beth MD         Current Outpatient Medications   Medication Sig Dispense Refill    fluticasone propionate (Flovent Diskus) 100 mcg/actuation dsdv Take  by inhalation. cyclobenzaprine (FLEXERIL) 10 mg tablet TAKE 1 TABLET BY MOUTH 3 TIMES A DAY AS NEEDED  1    predniSONE (DELTASONE) 10 mg tablet TAKE 2 TABLETS BY MOUTH EVERY DAY AS NEEDED  3    ALBUTEROL IN Take  by inhalation. FLUTICASONE/SALMETEROL (ADVAIR DISKUS IN) Take  by inhalation. ALPRAZOLAM (XANAX PO) Take  by mouth. HYDROCODONE/ACETAMINOPHEN (LORTAB PO) Take  by mouth. ASPIRIN (ELIZABETH LOW STRENGTH PO) Take  by mouth.          Past History     Past Medical History:  Past Medical History:   Diagnosis Date    Asthma     Hepatitis     auto immune    Menopause     SVT (supraventricular tachycardia) ([facility] [address]        Past Surgical History:  Past Surgical History:   Procedure Laterality Date    COLONOSCOPY N/A 12/12/2019    COLONOSCOPY performed by Jennifer Webber MD at [facility]    HX BREAST BIOPSY Left 2/2009    abscessed tissue    HX CARPAL TUNNEL RELEASE Bilateral     HX CATARACT REMOVAL Bilateral     HX COLONOSCOPY  12/2011    adenomatous polyps x 2    HX ORTHOPAEDIC Left 2006    foot       Family History:  Family History   Problem Relation Age of Onset    Cancer Mother         lung    Cancer Sister         lung       Social History:  Social History     Tobacco Use    Smoking status: Never    Smokeless tobacco: Never   Substance Use Topics    Alcohol use: No    Drug use: No       Allergies:  No Known Allergies      Review of Systems   Review of Systems  Constitutional: Negative for fever, chills, and fatigue. HENT: Negative for congestion, sore throat, rhinorrhea, sneezing and neck stiffness   Eyes: Negative for discharge and redness. Respiratory: Negative for  shortness of breath, wheezing   Cardiovascular: Negative for chest pain, palpitations   Gastrointestinal: Positive for nausea,  abdominal pain. Negative constipation, diarrhea and blood in stool. Genitourinary: Negative for dysuria, urgency, frequency, hematuria, flank pain, decreased urine volume, discharge,   Musculoskeletal: Negative for myalgias or joint pain . Skin: Negative for rash or lesions . Neurological: Negative weakness, light-headedness, numbness and headaches. Physical Exam   Physical Exam    GENERAL: alert and oriented, no acute distress  EYES: PEERL, No injection, discharge or icterus. ENT: Mucous membranes pink and moist.  NECK: Supple  LUNGS: Airway patent. Non-labored respirations. Breath sounds clear with good air entry bilaterally. HEART: Tachycardic, regular rhythm. No peripheral edema  ABDOMEN: Non-distended, epigastric and right upper quadrant tenderness without guarding or rebound. SKIN:  warm, dry  EXTREMITIES: Without swelling, tenderness or deformity, symmetric with normal ROM  NEUROLOGICAL: Alert, oriented      Diagnostic Study Results     Labs -     Recent Results (from the past 12 hour(s))   CBC WITH AUTOMATED DIFF    Collection Time: 09/22/22  6:40 PM   Result Value Ref Range    WBC 14.0 (H) 3.6 - 11.0 K/uL    RBC 4.35 3.80 - 5.20 M/uL    HGB 10.2 (L) 11.5 - 16.0 g/dL    HCT 33.6 (L) 35.0 - 47.0 %    MCV 77.2 (L) 80.0 - 99.0 FL    MCH 23.4 (L) 26.0 - 34.0 PG    MCHC 30.4 30.0 - 36.5 g/dL    RDW 18.5 (H) 11.5 - 14.5 %    PLATELET 213 770 - 892 K/uL    MPV 9.7 8.9 - 12.9 FL    NRBC 0.1 (H) 0  WBC    ABSOLUTE NRBC 0.02 (H) 0.00 - 0.01 K/uL    NEUTROPHILS 93 (H) 32 - 75 %    BAND NEUTROPHILS 3 %    LYMPHOCYTES 2 (L) 12 - 49 %    MONOCYTES 2 (L) 5 - 13 %    EOSINOPHILS 0 0 - 7 %    BASOPHILS 0 0 - 1 %    IMMATURE GRANULOCYTES 0 0.0 - 0.5 %    ABS. NEUTROPHILS 13.4 (H) 1.8 - 8.0 K/UL    ABS. LYMPHOCYTES 0.3 (L) 0.8 - 3.5 K/UL    ABS. MONOCYTES 0.3 0.0 - 1.0 K/UL    ABS. EOSINOPHILS 0.0 0.0 - 0.4 K/UL    ABS. BASOPHILS 0.0 0.0 - 0.1 K/UL    ABS. IMM. GRANS. 0.0 0.00 - 0.04 K/UL    DF MANUAL      PLATELET COMMENTS ADEQUATE PLATELETS      RBC COMMENTS ANISOCYTOSIS  1+       METABOLIC PANEL, COMPREHENSIVE    Collection Time: 09/22/22  6:40 PM   Result Value Ref Range    Sodium 134 (L) 136 - 145 mmol/L    Potassium 4.1 3.5 - 5.1 mmol/L    Chloride 100 97 - 108 mmol/L    CO2 25 21 - 32 mmol/L    Anion gap 9 5 - 15 mmol/L    Glucose 114 (H) 65 - 100 mg/dL    BUN 10 6 - 20 MG/DL    Creatinine 0.80 0.55 - 1.02 MG/DL    BUN/Creatinine ratio 13 12 - 20      GFR est AA >60 >60 ml/min/1.73m2    GFR est non-AA >60 >60 ml/min/1.73m2    Calcium 9.2 8.5 - 10.1 MG/DL    Bilirubin, total 2.0 (H) 0.2 - 1.0 MG/DL    ALT (SGPT) 27 12 - 78 U/L    AST (SGOT) 25 15 - 37 U/L    Alk. phosphatase 83 45 - 117 U/L    Protein, total 7.3 6.4 - 8.2 g/dL    Albumin 3.3 (L) 3.5 - 5.0 g/dL    Globulin 4.0 2.0 - 4.0 g/dL    A-G Ratio 0.8 (L) 1.1 - 2.2     LIPASE    Collection Time: 09/22/22  6:40 PM   Result Value Ref Range    Lipase 21 (L) 73 - 393 U/L   LACTIC ACID    Collection Time: 09/22/22  6:40 PM   Result Value Ref Range    Lactic acid 1.5 0.4 - 2.0 MMOL/L   PROTHROMBIN TIME + INR    Collection Time: 09/22/22  6:40 PM   Result Value Ref Range    INR 1.1 0.9 - 1.1      Prothrombin time 11.1 9.0 - 25.0 sec   METABOLIC PANEL, COMPREHENSIVE    Collection Time: 09/23/22  5:00 AM   Result Value Ref Range    Sodium 136 136 - 145 mmol/L    Potassium 3.9 3.5 - 5.1 mmol/L    Chloride 103 97 - 108 mmol/L    CO2 25 21 - 32 mmol/L    Anion gap 8 5 - 15 mmol/L    Glucose 100 65 - 100 mg/dL    BUN 11 6 - 20 MG/DL    Creatinine 0.81 0.55 - 1.02 MG/DL    BUN/Creatinine ratio 14 12 - 20      GFR est AA >60 >60 ml/min/1.73m2    GFR est non-AA >60 >60 ml/min/1.73m2    Calcium 8.4 (L) 8.5 - 10.1 MG/DL    Bilirubin, total 1.9 (H) 0.2 - 1.0 MG/DL    ALT (SGPT) 23 12 - 78 U/L    AST (SGOT) 21 15 - 37 U/L    Alk.  phosphatase 78 45 - 117 U/L    Protein, total 6.6 6.4 - 8.2 g/dL    Albumin 2.8 (L) 3.5 - 5.0 g/dL    Globulin 3.8 2.0 - 4.0 g/dL    A-G Ratio 0.7 (L) 1.1 - 2.2     CBC W/O DIFF    Collection Time: 09/23/22  5:00 AM   Result Value Ref Range    WBC 13.0 (H) 3.6 - 11.0 K/uL    RBC 3.88 3.80 - 5.20 M/uL    HGB 9.0 (L) 11.5 - 16.0 g/dL    HCT 30.4 (L) 35.0 - 47.0 %    MCV 78.4 (L) 80.0 - 99.0 FL    MCH 23.2 (L) 26.0 - 34.0 PG    MCHC 29.6 (L) 30.0 - 36.5 g/dL    RDW 18.6 (H) 11.5 - 14.5 %    PLATELET 992 746 - 409 K/uL    MPV 9.6 8.9 - 12.9 FL    NRBC 0.0 0  WBC    ABSOLUTE NRBC 0.00 0.00 - 0.01 K/uL   URINALYSIS W/ RFLX MICROSCOPIC    Collection Time: 09/23/22  5:25 AM   Result Value Ref Range    Color YELLOW/STRAW      Appearance CLEAR CLEAR      Specific gravity 1.025 1.003 - 1.030      pH (UA) 6.0 5.0 - 8.0      Protein TRACE (A) NEG mg/dL    Glucose Negative NEG mg/dL    Ketone Negative NEG mg/dL    Bilirubin Negative NEG      Blood TRACE (A) NEG      Urobilinogen 1.0 0.2 - 1.0 EU/dL    Nitrites Negative NEG      Leukocyte Esterase Negative NEG     URINE MICROSCOPIC ONLY    Collection Time: 09/23/22  5:25 AM   Result Value Ref Range    WBC 0-4 0 - 4 /hpf    RBC 5-10 0 - 5 /hpf    Epithelial cells MODERATE (A) FEW /lpf    Bacteria 1+ (A) NEG /hpf       Radiologic Studies -   CT ABD PELV W CONT   Final Result      1. Cirrhosis is suspected. No hepatic mass, intrahepatic or ureteral dilation or   extrahepatic bile duct dilation. 2. At least 2 gallstones in the gallbladder neck, the larger measuring 18 mm. Gallbladder mildly distended. CT Results  (Last 48 hours)                 09/22/22 1955  CT ABD PELV W CONT Final result    Impression:      1. Cirrhosis is suspected. No hepatic mass, intrahepatic or ureteral dilation or   extrahepatic bile duct dilation. 2. At least 2 gallstones in the gallbladder neck, the larger measuring 18 mm. Gallbladder mildly distended. Narrative:  EXAM: CT ABD PELV W CONT       INDICATION: abd pain, leukocystosis       COMPARISON: None        CONTRAST: 100 mL of Isovue-300. ORAL CONTRAST: None. The lack of oral contrast material diminishes the capacity   of CT to evaluate the bowel and adjacent structures. TECHNIQUE:    Following the uneventful intravenous administration of contrast, thin axial   images were obtained through the abdomen and pelvis. Coronal and sagittal   reconstructions were generated. CT dose reduction was achieved through use of a   standardized protocol tailored for this examination and automatic exposure   control for dose modulation. Note that imaging is suboptimal due to artifacts arising from the the anterior   subcutaneous fat due to the patient being larger than the imaging gantry.        FINDINGS:    LOWER THORAX: Medial segment right middle lobe scarring versus atelectasis. LIVER: There is a somewhat lobular contour of the liver with length of 18 cm. Findings suggest cirrhosis. No hepatic mass or intrahepatic biliary ductal   dilation is shown. BILIARY TREE: Mildly distended. There are at least 2 gallstones in the   gallbladder neck, the larger measuring 18 mm. No extrahepatic bile duct dilation   is shown. SPLEEN: within normal limits. PANCREAS: No mass or ductal dilatation. ADRENALS: Unremarkable. KIDNEYS: No mass, calculus, or hydronephrosis. STOMACH: Small to moderate hiatal hernia. SMALL BOWEL: No dilatation or wall thickening. COLON: No dilatation or wall thickening. APPENDIX: Normal.   PERITONEUM: No ascites or pneumoperitoneum. RETROPERITONEUM: No lymphadenopathy or aortic aneurysm. REPRODUCTIVE ORGANS: Normal.   URINARY BLADDER: No mass or calculus. BONES: No destructive bone lesion. ABDOMINAL WALL: No mass or hernia. ADDITIONAL COMMENTS: N/A                 CXR Results  (Last 48 hours)      None              Medical Decision Making     ILynn MD am the first provider for this patient and am the attending of record for this patient encounter. I reviewed the vital signs, available nursing notes, past medical history, past surgical history, family history and social history. Vital Signs-Reviewed the patient's vital signs. Patient Vitals for the past 12 hrs:   Pulse Resp BP SpO2   09/23/22 0430 92 16 132/66 98 %   09/22/22 2038 89 16 (!) 144/67 99 %   09/22/22 2019 94 18 (!) 143/63 99 %           Records Reviewed: Nursing Notes and Old Medical Records    Provider Notes (Medical Decision Making): On presentation, the patient is well appearing, however was mildly tachycardic on arrival, right upper quadrant pain. Labs are notable for leukocytosis, mild elevation of bilirubin.   CT scan showed a distended gallbladder, several gallstones in the gallbladder neck, no bile duct dilation. Concern for possible early cholecystitis. Consulted with our general surgeon Dr. Peter Ferrara who felt the patient should be transferred as she may ultimately require an ERCP. We will plan to transfer patient per his recommendations. ED Course:   Initial assessment performed. The patients presenting problems have been discussed, and they are in agreement with the care plan formulated and outlined with them. I have encouraged them to ask questions as they arise throughout their visit. ED Course as of 09/23/22 0616   Fri Sep 23, 2022   0111 Notified by [facility] that they will be unable to  patient prior to 10 AM tomorrow morning [BN]      ED Course User Index  [BN] Nilsa Bah MD       PROGRESS:  The patient has been re-evaluated and sx have improved. Reviewed available results with patient and have counseled them on diagnosis and care plan. They have expressed understanding, and all their questions have been answered. They agree with plan for admission. CONSULT:  Megan Rocha MD spoke with Dr. Ramon Sequeira, general surgery. Discussed HPI and PE, available diagnostic tests and clinical findings. He agrees with plan for hospitalist admission, will consult on patient. Admit Note  Patient is being admitted to the hospitalist.  The results of their tests and reasons for their admission have been discussed with them and/or available family. They convey agreement and understanding for the need to be admitted and for their admission diagnosis. Consultation has been made with the inpatient physician specialist for hospitalization. Disposition:  admission    PLAN:  1. Admit     Diagnosis     Clinical Impression:   1. Cholecystitis    2. Sepsis, due to unspecified organism, unspecified whether acute organ dysfunction present [facility])        Please note that this dictation was completed with Dragon, computer voice recognition software.   Quite often unanticipated grammatical, syntax, homophones, and other interpretive errors are inadvertently transcribed by the computer software. Please disregard these errors. Additionally, please excuse any errors that have escaped final proofreading.

## 2022-09-23 NOTE — PROGRESS NOTES
4769 - Dr. Tyson Issa advised to arranged BLS transportation from [facility] 8 to [facility].   Arranged [facility] BLS transport w/[facility].    4389 - received room assignment [facility] #[id number]) - updated [facility] of room assignement, confirmed BLS transportation - ETA of 1000

## 2022-09-23 NOTE — ED NOTES
TRANSFER - OUT REPORT:    Verbal report given to [facility] AT [facility] RN(name) on Trell Blackburn  being transferred to [place] [phone](unit) for routine progression of care       Report consisted of patients Situation, Background, Assessment and   Recommendations(SBAR). Information from the following report(s) SBAR, ED Summary, Recent Results and Med Rec Status was reviewed with the receiving nurse. Lines:   Peripheral IV 09/22/22 Right Antecubital (Active)   Site Assessment Clean, dry, & intact 09/22/22 1842   Phlebitis Assessment 0 09/22/22 1842   Infiltration Assessment 0 09/22/22 1842   Dressing Status Clean, dry, & intact 09/22/22 1842   Dressing Type Transparent 09/22/22 1842        Opportunity for questions and clarification was provided.       Patient transported with:  125ml/hr LR

## 2022-09-24 LAB
ALBUMIN SERPL-MCNC: 2.8 G/DL (ref 3.5–5)
ALBUMIN/GLOB SERPL: 0.7 {RATIO} (ref 1.1–2.2)
ALP SERPL-CCNC: 88 U/L (ref 45–117)
ALT SERPL-CCNC: 25 U/L (ref 12–78)
ANION GAP SERPL CALC-SCNC: 7 MMOL/L (ref 5–15)
AST SERPL-CCNC: 15 U/L (ref 15–37)
BASOPHILS # BLD: 0 K/UL (ref 0–0.1)
BASOPHILS NFR BLD: 0 % (ref 0–1)
BILIRUB SERPL-MCNC: 1.3 MG/DL (ref 0.2–1)
BUN SERPL-MCNC: 10 MG/DL (ref 6–20)
BUN/CREAT SERPL: 13 (ref 12–20)
CALCIUM SERPL-MCNC: 8.5 MG/DL (ref 8.5–10.1)
CHLORIDE SERPL-SCNC: 104 MMOL/L (ref 97–108)
CO2 SERPL-SCNC: 25 MMOL/L (ref 21–32)
CREAT SERPL-MCNC: 0.78 MG/DL (ref 0.55–1.02)
DIFFERENTIAL METHOD BLD: ABNORMAL
EOSINOPHIL # BLD: 0 K/UL (ref 0–0.4)
EOSINOPHIL NFR BLD: 0 % (ref 0–7)
ERYTHROCYTE [DISTWIDTH] IN BLOOD BY AUTOMATED COUNT: 18.1 % (ref 11.5–14.5)
GLOBULIN SER CALC-MCNC: 4.1 G/DL (ref 2–4)
GLUCOSE SERPL-MCNC: 89 MG/DL (ref 65–100)
HAV IGM SER QL: NONREACTIVE
HBV CORE IGM SER QL: NONREACTIVE
HBV SURFACE AG SER QL: <0.1 INDEX
HBV SURFACE AG SER QL: NEGATIVE
HCT VFR BLD AUTO: 29.9 % (ref 35–47)
HCV AB SERPL QL IA: NONREACTIVE
HGB BLD-MCNC: 8.8 G/DL (ref 11.5–16)
IMM GRANULOCYTES # BLD AUTO: 0 K/UL (ref 0–0.04)
IMM GRANULOCYTES NFR BLD AUTO: 0 % (ref 0–0.5)
LYMPHOCYTES # BLD: 1 K/UL (ref 0.8–3.5)
LYMPHOCYTES NFR BLD: 11 % (ref 12–49)
MCH RBC QN AUTO: 23.3 PG (ref 26–34)
MCHC RBC AUTO-ENTMCNC: 29.4 G/DL (ref 30–36.5)
MCV RBC AUTO: 79.1 FL (ref 80–99)
MONOCYTES # BLD: 0.4 K/UL (ref 0–1)
MONOCYTES NFR BLD: 5 % (ref 5–13)
NEUTS SEG # BLD: 7.7 K/UL (ref 1.8–8)
NEUTS SEG NFR BLD: 84 % (ref 32–75)
NRBC # BLD: 0 K/UL (ref 0–0.01)
NRBC BLD-RTO: 0 PER 100 WBC
PLATELET # BLD AUTO: 325 K/UL (ref 150–400)
PMV BLD AUTO: 9.5 FL (ref 8.9–12.9)
POTASSIUM SERPL-SCNC: 3.3 MMOL/L (ref 3.5–5.1)
PROT SERPL-MCNC: 6.9 G/DL (ref 6.4–8.2)
RBC # BLD AUTO: 3.78 M/UL (ref 3.8–5.2)
SODIUM SERPL-SCNC: 136 MMOL/L (ref 136–145)
SP1: NORMAL
SP2: NORMAL
SP3: NORMAL
WBC # BLD AUTO: 9.1 K/UL (ref 3.6–11)

## 2022-09-24 PROCEDURE — 65270000029 HC RM PRIVATE

## 2022-09-24 PROCEDURE — 94664 DEMO&/EVAL PT USE INHALER: CPT

## 2022-09-24 PROCEDURE — 94640 AIRWAY INHALATION TREATMENT: CPT

## 2022-09-24 PROCEDURE — 85025 COMPLETE CBC W/AUTO DIFF WBC: CPT

## 2022-09-24 PROCEDURE — 80053 COMPREHEN METABOLIC PANEL: CPT

## 2022-09-24 PROCEDURE — 74011000250 HC RX REV CODE- 250: Performed by: FAMILY MEDICINE

## 2022-09-24 PROCEDURE — 74011250636 HC RX REV CODE- 250/636: Performed by: FAMILY MEDICINE

## 2022-09-24 PROCEDURE — 77010033678 HC OXYGEN DAILY

## 2022-09-24 PROCEDURE — 99231 SBSQ HOSP IP/OBS SF/LOW 25: CPT | Performed by: SURGERY

## 2022-09-24 PROCEDURE — 80074 ACUTE HEPATITIS PANEL: CPT

## 2022-09-24 PROCEDURE — 36415 COLL VENOUS BLD VENIPUNCTURE: CPT

## 2022-09-24 PROCEDURE — 74011250636 HC RX REV CODE- 250/636: Performed by: INTERNAL MEDICINE

## 2022-09-24 RX ORDER — DEXTROSE, SODIUM CHLORIDE, AND POTASSIUM CHLORIDE 5; .45; .22 G/100ML; G/100ML; G/100ML
INJECTION INTRAVENOUS CONTINUOUS
Status: DISPENSED | OUTPATIENT
Start: 2022-09-24 | End: 2022-09-25

## 2022-09-24 RX ORDER — INDOCYANINE GREEN AND WATER 25 MG
1.5 KIT INJECTION ONCE
Status: COMPLETED | OUTPATIENT
Start: 2022-09-25 | End: 2022-09-25

## 2022-09-24 RX ORDER — VERAPAMIL HYDROCHLORIDE 120 MG/1
120 TABLET, FILM COATED, EXTENDED RELEASE ORAL DAILY
Status: DISCONTINUED | OUTPATIENT
Start: 2022-09-25 | End: 2022-09-27 | Stop reason: HOSPADM

## 2022-09-24 RX ORDER — VERAPAMIL HYDROCHLORIDE 120 MG/1
120 TABLET, FILM COATED, EXTENDED RELEASE ORAL DAILY
COMMUNITY
Start: 2022-08-26

## 2022-09-24 RX ADMIN — POTASSIUM CHLORIDE, DEXTROSE MONOHYDRATE AND SODIUM CHLORIDE: 224; 5; 450 INJECTION, SOLUTION INTRAVENOUS at 09:33

## 2022-09-24 RX ADMIN — HEPARIN SODIUM 5000 UNITS: 5000 INJECTION INTRAVENOUS; SUBCUTANEOUS at 17:30

## 2022-09-24 RX ADMIN — POTASSIUM CHLORIDE, DEXTROSE MONOHYDRATE AND SODIUM CHLORIDE: 224; 5; 450 INJECTION, SOLUTION INTRAVENOUS at 10:26

## 2022-09-24 RX ADMIN — ARFORMOTEROL TARTRATE 15 MCG: 15 SOLUTION RESPIRATORY (INHALATION) at 08:00

## 2022-09-24 RX ADMIN — CEFTRIAXONE SODIUM 1 G: 1 INJECTION, POWDER, FOR SOLUTION INTRAMUSCULAR; INTRAVENOUS at 14:26

## 2022-09-24 RX ADMIN — BUDESONIDE 250 MCG: 0.25 INHALANT RESPIRATORY (INHALATION) at 23:19

## 2022-09-24 RX ADMIN — POTASSIUM CHLORIDE, DEXTROSE MONOHYDRATE AND SODIUM CHLORIDE: 224; 5; 450 INJECTION, SOLUTION INTRAVENOUS at 11:21

## 2022-09-24 RX ADMIN — BUDESONIDE 500 MCG: 0.25 INHALANT RESPIRATORY (INHALATION) at 08:00

## 2022-09-24 RX ADMIN — ARFORMOTEROL TARTRATE 15 MCG: 15 SOLUTION RESPIRATORY (INHALATION) at 23:21

## 2022-09-24 RX ADMIN — METRONIDAZOLE 500 MG: 500 INJECTION, SOLUTION INTRAVENOUS at 14:26

## 2022-09-24 RX ADMIN — HEPARIN SODIUM 5000 UNITS: 5000 INJECTION INTRAVENOUS; SUBCUTANEOUS at 09:33

## 2022-09-24 RX ADMIN — METRONIDAZOLE 500 MG: 500 INJECTION, SOLUTION INTRAVENOUS at 03:02

## 2022-09-24 NOTE — PROGRESS NOTES
Hospitalist Progress Note      Hospital summary: Martha Red is a 78 y.o. female who presents with abdominal pain. Patient reports abdominal pain is started on Tuesday evening. Moderate pain. She thought it was a gas, took over-the-counter medication no improvement. Next days feeling the same went to emergency room, had CT scan of abdomen diagnosed with gallstone disease. She was advised needs cholecystectomy. Transferred here for further evaluation and treatment. Patient reports in 1996 was diagnosed with cirrhosis, had elevated liver enzyme but normalized. No history of EtOH. Upon arrival in the emergency room CT scan of abdomen showed gallbladder with wall thickness, likely cirrhosis and gallstone. Patient reports taking medication for asthma anxiety she has osteoarthritis taking Lortab. Taking verapamil 120 mg for rapid heart rate. 9/23/2022      Assessment/Plan:  Acute Cholecystitis  - Pt presented with abd pain. Leukocytosis on poa - resolved   - CT abd: Cirrhosis is suspected. No hepatic mass, intrahepatic or ureteral dilation or extrahepatic bile duct dilation. At least 2 gallstones in the gallbladder neck, the larger measuring 18 mm. Gallbladder mildly distended  - US abd; Gallbladder distention with intraluminal calculi and a possible 17 mm calculus lodged in the gallbladder neck. Correlate for acute cholecystitis. Common bile duct dilatation without visualized choledocholithiasis. - appreciate Gen surgery input  - CLD today, plan for OR tomorrow   - c/w IV Ceftriaxone and flagyl     Hx Liver cirrhosis - not on any meds   Asthma -stable , c/w brovana/ pulmicort   History of tachyarrhythmia - c/w home verapamil     Code status: Full  DVT prophylaxis: SCDs  Disposition: TBD  ----------------------------------------------    CC: Abd pain     S: Patient is seen and examined at bedside this AM. She feels ok, pain controlled. Tolerated liquid diet.  Waiting for surgery plan for OR  Discussed with nursing and Surgery Dr. Julienne Quinn     Review of Systems:  A comprehensive review of systems was negative. O:  Visit Vitals  BP (!) 153/84   Pulse 74   Temp 99.4 °F (37.4 °C)   Resp 18   SpO2 96%       PHYSICAL EXAM:  Gen: NAD  HEENT: anicteric sclerae, normal conjunctiva, oropharynx clear, MM moist  Neck: supple, trachea midline, no adenopathy  Heart: RRR, no MRG, no JVD, no peripheral edema  Lungs: CTA b/l, non-labored respirations  Abd: soft, RUQ tenderness, ND, BS+, no organomegaly  Extr: warm  Skin: dry, no rash  Neuro: CN II-XII grossly intact, normal speech, moves all extremities  Psych: normal mood, appropriate affect    No intake or output data in the 24 hours ending 09/24/22 1538     Recent labs & imaging reviewed:  Recent Results (from the past 24 hour(s))   BILIRUBIN, DIRECT    Collection Time: 09/23/22  4:58 PM   Result Value Ref Range    Bilirubin, direct 0.5 (H) 0.0 - 0.2 MG/DL   METABOLIC PANEL, COMPREHENSIVE    Collection Time: 09/24/22 12:22 AM   Result Value Ref Range    Sodium 136 136 - 145 mmol/L    Potassium 3.3 (L) 3.5 - 5.1 mmol/L    Chloride 104 97 - 108 mmol/L    CO2 25 21 - 32 mmol/L    Anion gap 7 5 - 15 mmol/L    Glucose 89 65 - 100 mg/dL    BUN 10 6 - 20 MG/DL    Creatinine 0.78 0.55 - 1.02 MG/DL    BUN/Creatinine ratio 13 12 - 20      GFR est AA >60 >60 ml/min/1.73m2    GFR est non-AA >60 >60 ml/min/1.73m2    Calcium 8.5 8.5 - 10.1 MG/DL    Bilirubin, total 1.3 (H) 0.2 - 1.0 MG/DL    ALT (SGPT) 25 12 - 78 U/L    AST (SGOT) 15 15 - 37 U/L    Alk.  phosphatase 88 45 - 117 U/L    Protein, total 6.9 6.4 - 8.2 g/dL    Albumin 2.8 (L) 3.5 - 5.0 g/dL    Globulin 4.1 (H) 2.0 - 4.0 g/dL    A-G Ratio 0.7 (L) 1.1 - 2.2     CBC WITH AUTOMATED DIFF    Collection Time: 09/24/22 12:22 AM   Result Value Ref Range    WBC 9.1 3.6 - 11.0 K/uL    RBC 3.78 (L) 3.80 - 5.20 M/uL    HGB 8.8 (L) 11.5 - 16.0 g/dL    HCT 29.9 (L) 35.0 - 47.0 %    MCV 79.1 (L) 80.0 - 99.0 FL    MCH 23.3 (L) 26.0 - 34.0 PG    MCHC 29.4 (L) 30.0 - 36.5 g/dL    RDW 18.1 (H) 11.5 - 14.5 %    PLATELET 462 382 - 545 K/uL    MPV 9.5 8.9 - 12.9 FL    NRBC 0.0 0  WBC    ABSOLUTE NRBC 0.00 0.00 - 0.01 K/uL    NEUTROPHILS 84 (H) 32 - 75 %    LYMPHOCYTES 11 (L) 12 - 49 %    MONOCYTES 5 5 - 13 %    EOSINOPHILS 0 0 - 7 %    BASOPHILS 0 0 - 1 %    IMMATURE GRANULOCYTES 0 0.0 - 0.5 %    ABS. NEUTROPHILS 7.7 1.8 - 8.0 K/UL    ABS. LYMPHOCYTES 1.0 0.8 - 3.5 K/UL    ABS. MONOCYTES 0.4 0.0 - 1.0 K/UL    ABS. EOSINOPHILS 0.0 0.0 - 0.4 K/UL    ABS. BASOPHILS 0.0 0.0 - 0.1 K/UL    ABS. IMM. GRANS. 0.0 0.00 - 0.04 K/UL    DF AUTOMATED     HEPATITIS PANEL, ACUTE    Collection Time: 09/24/22 12:22 AM   Result Value Ref Range    Hepatitis A, IgM NONREACTIVE NR      __          Hepatitis B surface Ag <0.10 Index    Hep B surface Ag Interp. Negative NEG      __          Hepatitis B core, IgM NONREACTIVE NR      __          Hep C virus Ab Interp. NONREACTIVE NR       Recent Labs     09/24/22  0022 09/23/22  0500   WBC 9.1 13.0*   HGB 8.8* 9.0*   HCT 29.9* 30.4*    348     Recent Labs     09/24/22  0022 09/23/22  0500 09/22/22  1840    136 134*   K 3.3* 3.9 4.1    103 100   CO2 25 25 25   BUN 10 11 10   CREA 0.78 0.81 0.80   GLU 89 100 114*   CA 8.5 8.4* 9.2     Recent Labs     09/24/22  0022 09/23/22  0500 09/22/22  1840   ALT 25 23 27   AP 88 78 83   TBILI 1.3* 1.9* 2.0*   TP 6.9 6.6 7.3   ALB 2.8* 2.8* 3.3*   GLOB 4.1* 3.8 4.0   LPSE  --   --  21*     Recent Labs     09/22/22  1840   INR 1.1   PTP 11.1      No results for input(s): FE, TIBC, PSAT, FERR in the last 72 hours. No results found for: FOL, RBCF   No results for input(s): PH, PCO2, PO2 in the last 72 hours. No results for input(s): CPK, CKNDX, TROIQ in the last 72 hours.     No lab exists for component: CPKMB  Lab Results   Component Value Date/Time    Cholesterol, total 181 09/18/2020 08:59 AM    HDL Cholesterol 55 09/18/2020 08:59 AM    LDL, calculated 110.6 (H) 09/18/2020 08:59 AM    Triglyceride 77 09/18/2020 08:59 AM     No results found for: [facility]  Lab Results   Component Value Date/Time    Color YELLOW/STRAW 09/23/2022 05:25 AM    Appearance CLEAR 09/23/2022 05:25 AM    Specific gravity 1.025 09/23/2022 05:25 AM    pH (UA) 6.0 09/23/2022 05:25 AM    Protein TRACE (A) 09/23/2022 05:25 AM    Glucose Negative 09/23/2022 05:25 AM    Ketone Negative 09/23/2022 05:25 AM    Bilirubin Negative 09/23/2022 05:25 AM    Urobilinogen 1.0 09/23/2022 05:25 AM    Nitrites Negative 09/23/2022 05:25 AM    Leukocyte Esterase Negative 09/23/2022 05:25 AM    Epithelial cells MODERATE (A) 09/23/2022 05:25 AM    Bacteria 1+ (A) 09/23/2022 05:25 AM    WBC 0-4 09/23/2022 05:25 AM    RBC 5-10 09/23/2022 05:25 AM       Med list reviewed  Current Facility-Administered Medications   Medication Dose Route Frequency    dextrose 5% - 0.45% NaCl with KCl 30 mEq/L infusion   IntraVENous CONTINUOUS    cefTRIAXone (ROCEPHIN) 1 g in 0.9% sodium chloride 10 mL IV syringe  1 g IntraVENous Q24H    morphine injection 2 mg  2 mg IntraVENous Q4H PRN    heparin (porcine) injection 5,000 Units  5,000 Units SubCUTAneous Q8H    metroNIDAZOLE (FLAGYL) IVPB premix 500 mg  500 mg IntraVENous Q12H    ALPRAZolam (XANAX) tablet 0.5 mg  0.5 mg Oral BID PRN    HYDROcodone-acetaminophen (NORCO) 5-325 mg per tablet 1 Tablet  1 Tablet Oral Q6H PRN    arformoteroL (BROVANA) neb solution 15 mcg  15 mcg Nebulization BID RT    And    budesonide (PULMICORT) 250 mcg/2ml nebulizer susp  500 mcg Nebulization BID RT       Care Plan discussed with:  Patient/Family, Nurse, and     Anirudh Mathias MD  Internal Medicine  Date of Service: 9/24/2022

## 2022-09-24 NOTE — PROGRESS NOTES
Progress Note    Patient: Tisa Lanes MRN: [id number]  SSN: [id number]    YOB: 1955  Age: 78 y.o. Sex: female      Admit Date: 2022    * No surgery date entered *    Procedure:  Procedure(s):  CHOLECYSTECTOMY LAPAROSCOPIC ROBOTIC    Subjective:     Patient feeling a little better today. Less abdominal pain    Objective:     Visit Vitals  BP (!) 153/84 (BP 1 Location: Right arm, BP Patient Position: At rest)   Pulse 74   Temp 99.4 °F (37.4 °C)   Resp 18   SpO2 96%       Temp (24hrs), Av.3 °F (37.4 °C), Min:98 °F (36.7 °C), Max:99.9 °F (37.7 °C)      Physical Exam:    General alert no acute distress  Lungs clear bilaterally  Heart regular rate and rhythm  Abdomen soft and mild epigastric tenderness no rebound or guarding    Data Review: images and reports reviewed    Lab Review: All lab results for the last 24 hours reviewed. Recent Results (from the past 24 hour(s))   METABOLIC PANEL, COMPREHENSIVE    Collection Time: 22 12:22 AM   Result Value Ref Range    Sodium 136 136 - 145 mmol/L    Potassium 3.3 (L) 3.5 - 5.1 mmol/L    Chloride 104 97 - 108 mmol/L    CO2 25 21 - 32 mmol/L    Anion gap 7 5 - 15 mmol/L    Glucose 89 65 - 100 mg/dL    BUN 10 6 - 20 MG/DL    Creatinine 0.78 0.55 - 1.02 MG/DL    BUN/Creatinine ratio 13 12 - 20      GFR est AA >60 >60 ml/min/1.73m2    GFR est non-AA >60 >60 ml/min/1.73m2    Calcium 8.5 8.5 - 10.1 MG/DL    Bilirubin, total 1.3 (H) 0.2 - 1.0 MG/DL    ALT (SGPT) 25 12 - 78 U/L    AST (SGOT) 15 15 - 37 U/L    Alk.  phosphatase 88 45 - 117 U/L    Protein, total 6.9 6.4 - 8.2 g/dL    Albumin 2.8 (L) 3.5 - 5.0 g/dL    Globulin 4.1 (H) 2.0 - 4.0 g/dL    A-G Ratio 0.7 (L) 1.1 - 2.2     CBC WITH AUTOMATED DIFF    Collection Time: 22 12:22 AM   Result Value Ref Range    WBC 9.1 3.6 - 11.0 K/uL    RBC 3.78 (L) 3.80 - 5.20 M/uL    HGB 8.8 (L) 11.5 - 16.0 g/dL    HCT 29.9 (L) 35.0 - 47.0 %    MCV 79.1 (L) 80.0 - 99.0 FL    MCH 23.3 (L) 26.0 - 34.0 PG MCHC 29.4 (L) 30.0 - 36.5 g/dL    RDW 18.1 (H) 11.5 - 14.5 %    PLATELET 767 664 - 300 K/uL    MPV 9.5 8.9 - 12.9 FL    NRBC 0.0 0  WBC    ABSOLUTE NRBC 0.00 0.00 - 0.01 K/uL    NEUTROPHILS 84 (H) 32 - 75 %    LYMPHOCYTES 11 (L) 12 - 49 %    MONOCYTES 5 5 - 13 %    EOSINOPHILS 0 0 - 7 %    BASOPHILS 0 0 - 1 %    IMMATURE GRANULOCYTES 0 0.0 - 0.5 %    ABS. NEUTROPHILS 7.7 1.8 - 8.0 K/UL    ABS. LYMPHOCYTES 1.0 0.8 - 3.5 K/UL    ABS. MONOCYTES 0.4 0.0 - 1.0 K/UL    ABS. EOSINOPHILS 0.0 0.0 - 0.4 K/UL    ABS. BASOPHILS 0.0 0.0 - 0.1 K/UL    ABS. IMM. GRANS. 0.0 0.00 - 0.04 K/UL    DF AUTOMATED     HEPATITIS PANEL, ACUTE    Collection Time: 09/24/22 12:22 AM   Result Value Ref Range    Hepatitis A, IgM NONREACTIVE NR      __          Hepatitis B surface Ag <0.10 Index    Hep B surface Ag Interp. Negative NEG      __          Hepatitis B core, IgM NONREACTIVE NR      __          Hep C virus Ab Interp. NONREACTIVE NR         Assessment:     Hospital Problems  Date Reviewed: 9/25/2020            Codes Class Noted POA    Cholecystitis ICD-10-CM: K81.9  ICD-9-CM: 575.10  9/23/2022 Unknown           Plan/Recommendations/Medical Decision Making:   Overall seems to be improving. Plan for robotic cholecystectomy tomorrow.   N.p.o. after midnight

## 2022-09-25 ENCOUNTER — APPOINTMENT (OUTPATIENT)
Dept: GENERAL RADIOLOGY | Age: 67
DRG: 418 | End: 2022-09-25
Attending: SURGERY
Payer: MEDICARE

## 2022-09-25 ENCOUNTER — ANESTHESIA EVENT (OUTPATIENT)
Dept: SURGERY | Age: 67
DRG: 418 | End: 2022-09-25
Payer: MEDICARE

## 2022-09-25 ENCOUNTER — ANESTHESIA (OUTPATIENT)
Dept: SURGERY | Age: 67
DRG: 418 | End: 2022-09-25
Payer: MEDICARE

## 2022-09-25 LAB
ALBUMIN SERPL-MCNC: 2.6 G/DL (ref 3.5–5)
ALBUMIN/GLOB SERPL: 0.6 {RATIO} (ref 1.1–2.2)
ALP SERPL-CCNC: 90 U/L (ref 45–117)
ALT SERPL-CCNC: 26 U/L (ref 12–78)
ANION GAP SERPL CALC-SCNC: 6 MMOL/L (ref 5–15)
AST SERPL-CCNC: 20 U/L (ref 15–37)
BASOPHILS # BLD: 0 K/UL (ref 0–0.1)
BASOPHILS NFR BLD: 0 % (ref 0–1)
BILIRUB SERPL-MCNC: 1.2 MG/DL (ref 0.2–1)
BUN SERPL-MCNC: 6 MG/DL (ref 6–20)
BUN/CREAT SERPL: 7 (ref 12–20)
CALCIUM SERPL-MCNC: 8.4 MG/DL (ref 8.5–10.1)
CHLORIDE SERPL-SCNC: 105 MMOL/L (ref 97–108)
CO2 SERPL-SCNC: 25 MMOL/L (ref 21–32)
CREAT SERPL-MCNC: 0.84 MG/DL (ref 0.55–1.02)
DIFFERENTIAL METHOD BLD: ABNORMAL
EOSINOPHIL # BLD: 0 K/UL (ref 0–0.4)
EOSINOPHIL NFR BLD: 0 % (ref 0–7)
ERYTHROCYTE [DISTWIDTH] IN BLOOD BY AUTOMATED COUNT: 18.2 % (ref 11.5–14.5)
GLOBULIN SER CALC-MCNC: 4.6 G/DL (ref 2–4)
GLUCOSE SERPL-MCNC: 115 MG/DL (ref 65–100)
HCT VFR BLD AUTO: 32.5 % (ref 35–47)
HGB BLD-MCNC: 9.5 G/DL (ref 11.5–16)
IMM GRANULOCYTES # BLD AUTO: 0.1 K/UL (ref 0–0.04)
IMM GRANULOCYTES NFR BLD AUTO: 1 % (ref 0–0.5)
LACTATE SERPL-SCNC: 1 MMOL/L (ref 0.4–2)
LYMPHOCYTES # BLD: 1.2 K/UL (ref 0.8–3.5)
LYMPHOCYTES NFR BLD: 13 % (ref 12–49)
MCH RBC QN AUTO: 23.2 PG (ref 26–34)
MCHC RBC AUTO-ENTMCNC: 29.2 G/DL (ref 30–36.5)
MCV RBC AUTO: 79.5 FL (ref 80–99)
MONOCYTES # BLD: 0.6 K/UL (ref 0–1)
MONOCYTES NFR BLD: 6 % (ref 5–13)
NEUTS SEG # BLD: 7.4 K/UL (ref 1.8–8)
NEUTS SEG NFR BLD: 80 % (ref 32–75)
NRBC # BLD: 0.02 K/UL (ref 0–0.01)
NRBC BLD-RTO: 0.2 PER 100 WBC
PLATELET # BLD AUTO: 389 K/UL (ref 150–400)
PMV BLD AUTO: 9.8 FL (ref 8.9–12.9)
POTASSIUM SERPL-SCNC: 3.4 MMOL/L (ref 3.5–5.1)
PROT SERPL-MCNC: 7.2 G/DL (ref 6.4–8.2)
RBC # BLD AUTO: 4.09 M/UL (ref 3.8–5.2)
SODIUM SERPL-SCNC: 136 MMOL/L (ref 136–145)
WBC # BLD AUTO: 9.3 K/UL (ref 3.6–11)

## 2022-09-25 PROCEDURE — 83605 ASSAY OF LACTIC ACID: CPT

## 2022-09-25 PROCEDURE — 36415 COLL VENOUS BLD VENIPUNCTURE: CPT

## 2022-09-25 PROCEDURE — 77030022704 HC SUT VLOC COVD -B: Performed by: SURGERY

## 2022-09-25 PROCEDURE — 47563 LAPARO CHOLECYSTECTOMY/GRAPH: CPT | Performed by: SURGERY

## 2022-09-25 PROCEDURE — 77030026438 HC STYL ET INTUB CARD -A: Performed by: ANESTHESIOLOGY

## 2022-09-25 PROCEDURE — 77030035277 HC OBTRTR BLDELSS DISP INTU -B: Performed by: SURGERY

## 2022-09-25 PROCEDURE — 74011250636 HC RX REV CODE- 250/636: Performed by: ANESTHESIOLOGY

## 2022-09-25 PROCEDURE — 77030002933 HC SUT MCRYL J&J -A: Performed by: SURGERY

## 2022-09-25 PROCEDURE — 74011000636 HC RX REV CODE- 636: Performed by: SURGERY

## 2022-09-25 PROCEDURE — 87040 BLOOD CULTURE FOR BACTERIA: CPT

## 2022-09-25 PROCEDURE — 76010000877 HC OR TIME 2.5 TO 3HR INTENSV - TIER 2: Performed by: SURGERY

## 2022-09-25 PROCEDURE — P9045 ALBUMIN (HUMAN), 5%, 250 ML: HCPCS | Performed by: ANESTHESIOLOGY

## 2022-09-25 PROCEDURE — 77030003666 HC NDL SPINAL BD -A: Performed by: SURGERY

## 2022-09-25 PROCEDURE — 77030038612 HC TU SUCT/IRR INTU -D: Performed by: SURGERY

## 2022-09-25 PROCEDURE — 77030020703 HC SEAL CANN DISP INTU -B: Performed by: SURGERY

## 2022-09-25 PROCEDURE — 65270000029 HC RM PRIVATE

## 2022-09-25 PROCEDURE — 74011250636 HC RX REV CODE- 250/636

## 2022-09-25 PROCEDURE — 74011000250 HC RX REV CODE- 250: Performed by: FAMILY MEDICINE

## 2022-09-25 PROCEDURE — 0FT44ZZ RESECTION OF GALLBLADDER, PERCUTANEOUS ENDOSCOPIC APPROACH: ICD-10-PCS | Performed by: SURGERY

## 2022-09-25 PROCEDURE — 77030002916 HC SUT ETHLN J&J -A: Performed by: SURGERY

## 2022-09-25 PROCEDURE — 74011250636 HC RX REV CODE- 250/636: Performed by: FAMILY MEDICINE

## 2022-09-25 PROCEDURE — 8E0W4CZ ROBOTIC ASSISTED PROCEDURE OF TRUNK REGION, PERCUTANEOUS ENDOSCOPIC APPROACH: ICD-10-PCS | Performed by: SURGERY

## 2022-09-25 PROCEDURE — 74011000250 HC RX REV CODE- 250: Performed by: SURGERY

## 2022-09-25 PROCEDURE — 77030040361 HC SLV COMPR DVT MDII -B: Performed by: SURGERY

## 2022-09-25 PROCEDURE — 77030040506 HC DRN WND MDII -A: Performed by: SURGERY

## 2022-09-25 PROCEDURE — 99232 SBSQ HOSP IP/OBS MODERATE 35: CPT | Performed by: SURGERY

## 2022-09-25 PROCEDURE — 2709999900 HC NON-CHARGEABLE SUPPLY: Performed by: SURGERY

## 2022-09-25 PROCEDURE — 76210000016 HC OR PH I REC 1 TO 1.5 HR: Performed by: SURGERY

## 2022-09-25 PROCEDURE — BF131ZZ FLUOROSCOPY OF GALLBLADDER AND BILE DUCTS USING LOW OSMOLAR CONTRAST: ICD-10-PCS | Performed by: SURGERY

## 2022-09-25 PROCEDURE — 85025 COMPLETE CBC W/AUTO DIFF WBC: CPT

## 2022-09-25 PROCEDURE — 77030008684 HC TU ET CUF COVD -B: Performed by: ANESTHESIOLOGY

## 2022-09-25 PROCEDURE — 77030039895 HC SYST SMK EVAC LAP COVD -B: Performed by: SURGERY

## 2022-09-25 PROCEDURE — 74300 X-RAY BILE DUCTS/PANCREAS: CPT

## 2022-09-25 PROCEDURE — 77030032522 HC SHT STPL PK ENDOWR INTU -B: Performed by: SURGERY

## 2022-09-25 PROCEDURE — 80053 COMPREHEN METABOLIC PANEL: CPT

## 2022-09-25 PROCEDURE — 77030008603 HC TRCR ENDOSC EPATH J&J -C: Performed by: SURGERY

## 2022-09-25 PROCEDURE — 74011000250 HC RX REV CODE- 250: Performed by: ANESTHESIOLOGY

## 2022-09-25 PROCEDURE — 77030007955 HC PCH ENDOSC SPEC J&J -B: Performed by: SURGERY

## 2022-09-25 PROCEDURE — S2900 ROBOTIC SURGICAL SYSTEM: HCPCS | Performed by: SURGERY

## 2022-09-25 PROCEDURE — 77030010939 HC CLP LIG TELE -B: Performed by: SURGERY

## 2022-09-25 PROCEDURE — 77030010507 HC ADH SKN DERMBND J&J -B: Performed by: SURGERY

## 2022-09-25 PROCEDURE — 77030016151 HC PROTCTR LNS DFOG COVD -B: Performed by: SURGERY

## 2022-09-25 PROCEDURE — 88304 TISSUE EXAM BY PATHOLOGIST: CPT

## 2022-09-25 PROCEDURE — 76060000037 HC ANESTHESIA 3 TO 3.5 HR: Performed by: SURGERY

## 2022-09-25 PROCEDURE — 77030042545 HC RELD STPLR SURG -C: Performed by: SURGERY

## 2022-09-25 PROCEDURE — 74011250636 HC RX REV CODE- 250/636: Performed by: SURGERY

## 2022-09-25 PROCEDURE — 77030002966 HC SUT PDS J&J -A: Performed by: SURGERY

## 2022-09-25 PROCEDURE — 74011000254 HC RX REV CODE- 254: Performed by: SURGERY

## 2022-09-25 RX ORDER — FENTANYL CITRATE 50 UG/ML
INJECTION, SOLUTION INTRAMUSCULAR; INTRAVENOUS AS NEEDED
Status: DISCONTINUED | OUTPATIENT
Start: 2022-09-25 | End: 2022-09-25 | Stop reason: HOSPADM

## 2022-09-25 RX ORDER — OXYCODONE AND ACETAMINOPHEN 10; 325 MG/1; MG/1
1 TABLET ORAL
Status: DISCONTINUED | OUTPATIENT
Start: 2022-09-25 | End: 2022-09-26

## 2022-09-25 RX ORDER — HYDROMORPHONE HYDROCHLORIDE 1 MG/ML
0.2 INJECTION, SOLUTION INTRAMUSCULAR; INTRAVENOUS; SUBCUTANEOUS
Status: DISCONTINUED | OUTPATIENT
Start: 2022-09-25 | End: 2022-09-25 | Stop reason: HOSPADM

## 2022-09-25 RX ORDER — SUCCINYLCHOLINE CHLORIDE 20 MG/ML
INJECTION INTRAMUSCULAR; INTRAVENOUS AS NEEDED
Status: DISCONTINUED | OUTPATIENT
Start: 2022-09-25 | End: 2022-09-25 | Stop reason: HOSPADM

## 2022-09-25 RX ORDER — SODIUM CHLORIDE, SODIUM LACTATE, POTASSIUM CHLORIDE, CALCIUM CHLORIDE 600; 310; 30; 20 MG/100ML; MG/100ML; MG/100ML; MG/100ML
125 INJECTION, SOLUTION INTRAVENOUS CONTINUOUS
Status: DISCONTINUED | OUTPATIENT
Start: 2022-09-25 | End: 2022-09-25 | Stop reason: HOSPADM

## 2022-09-25 RX ORDER — OXYCODONE AND ACETAMINOPHEN 5; 325 MG/1; MG/1
1 TABLET ORAL
Status: DISCONTINUED | OUTPATIENT
Start: 2022-09-25 | End: 2022-09-27 | Stop reason: HOSPADM

## 2022-09-25 RX ORDER — DIPHENHYDRAMINE HYDROCHLORIDE 50 MG/ML
12.5 INJECTION, SOLUTION INTRAMUSCULAR; INTRAVENOUS AS NEEDED
Status: DISCONTINUED | OUTPATIENT
Start: 2022-09-25 | End: 2022-09-25 | Stop reason: HOSPADM

## 2022-09-25 RX ORDER — SODIUM CHLORIDE 0.9 % (FLUSH) 0.9 %
5-40 SYRINGE (ML) INJECTION AS NEEDED
Status: CANCELLED | OUTPATIENT
Start: 2022-09-25

## 2022-09-25 RX ORDER — HYDROMORPHONE HYDROCHLORIDE 1 MG/ML
1 INJECTION, SOLUTION INTRAMUSCULAR; INTRAVENOUS; SUBCUTANEOUS
Status: DISCONTINUED | OUTPATIENT
Start: 2022-09-25 | End: 2022-09-27 | Stop reason: HOSPADM

## 2022-09-25 RX ORDER — DEXAMETHASONE SODIUM PHOSPHATE 4 MG/ML
INJECTION, SOLUTION INTRA-ARTICULAR; INTRALESIONAL; INTRAMUSCULAR; INTRAVENOUS; SOFT TISSUE AS NEEDED
Status: DISCONTINUED | OUTPATIENT
Start: 2022-09-25 | End: 2022-09-25 | Stop reason: HOSPADM

## 2022-09-25 RX ORDER — ACETAMINOPHEN 325 MG/1
650 TABLET ORAL ONCE
Status: CANCELLED | OUTPATIENT
Start: 2022-09-25 | End: 2022-09-25

## 2022-09-25 RX ORDER — OXYCODONE AND ACETAMINOPHEN 5; 325 MG/1; MG/1
1 TABLET ORAL AS NEEDED
Status: DISCONTINUED | OUTPATIENT
Start: 2022-09-25 | End: 2022-09-25 | Stop reason: HOSPADM

## 2022-09-25 RX ORDER — ALBUMIN HUMAN 50 G/1000ML
SOLUTION INTRAVENOUS AS NEEDED
Status: DISCONTINUED | OUTPATIENT
Start: 2022-09-25 | End: 2022-09-25 | Stop reason: HOSPADM

## 2022-09-25 RX ORDER — ONDANSETRON 2 MG/ML
INJECTION INTRAMUSCULAR; INTRAVENOUS AS NEEDED
Status: DISCONTINUED | OUTPATIENT
Start: 2022-09-25 | End: 2022-09-25 | Stop reason: HOSPADM

## 2022-09-25 RX ORDER — ROCURONIUM BROMIDE 10 MG/ML
INJECTION, SOLUTION INTRAVENOUS AS NEEDED
Status: DISCONTINUED | OUTPATIENT
Start: 2022-09-25 | End: 2022-09-25 | Stop reason: HOSPADM

## 2022-09-25 RX ORDER — CEFAZOLIN SODIUM 1 G/3ML
INJECTION, POWDER, FOR SOLUTION INTRAMUSCULAR; INTRAVENOUS AS NEEDED
Status: DISCONTINUED | OUTPATIENT
Start: 2022-09-25 | End: 2022-09-25 | Stop reason: HOSPADM

## 2022-09-25 RX ORDER — FENTANYL CITRATE 50 UG/ML
50 INJECTION, SOLUTION INTRAMUSCULAR; INTRAVENOUS AS NEEDED
Status: CANCELLED | OUTPATIENT
Start: 2022-09-25

## 2022-09-25 RX ORDER — DEXMEDETOMIDINE HYDROCHLORIDE 100 UG/ML
INJECTION, SOLUTION INTRAVENOUS AS NEEDED
Status: DISCONTINUED | OUTPATIENT
Start: 2022-09-25 | End: 2022-09-25 | Stop reason: HOSPADM

## 2022-09-25 RX ORDER — PROPOFOL 10 MG/ML
INJECTION, EMULSION INTRAVENOUS AS NEEDED
Status: DISCONTINUED | OUTPATIENT
Start: 2022-09-25 | End: 2022-09-25 | Stop reason: HOSPADM

## 2022-09-25 RX ORDER — FENTANYL CITRATE 50 UG/ML
INJECTION, SOLUTION INTRAMUSCULAR; INTRAVENOUS
Status: COMPLETED
Start: 2022-09-25 | End: 2022-09-25

## 2022-09-25 RX ORDER — HYDROMORPHONE HYDROCHLORIDE 2 MG/ML
INJECTION, SOLUTION INTRAMUSCULAR; INTRAVENOUS; SUBCUTANEOUS AS NEEDED
Status: DISCONTINUED | OUTPATIENT
Start: 2022-09-25 | End: 2022-09-25 | Stop reason: HOSPADM

## 2022-09-25 RX ORDER — FENTANYL CITRATE 50 UG/ML
25 INJECTION, SOLUTION INTRAMUSCULAR; INTRAVENOUS
Status: DISCONTINUED | OUTPATIENT
Start: 2022-09-25 | End: 2022-09-25 | Stop reason: HOSPADM

## 2022-09-25 RX ORDER — MIDAZOLAM HYDROCHLORIDE 1 MG/ML
1 INJECTION, SOLUTION INTRAMUSCULAR; INTRAVENOUS AS NEEDED
Status: CANCELLED | OUTPATIENT
Start: 2022-09-25

## 2022-09-25 RX ORDER — MORPHINE SULFATE 2 MG/ML
2 INJECTION, SOLUTION INTRAMUSCULAR; INTRAVENOUS
Status: DISCONTINUED | OUTPATIENT
Start: 2022-09-25 | End: 2022-09-25 | Stop reason: HOSPADM

## 2022-09-25 RX ORDER — LIDOCAINE HYDROCHLORIDE 10 MG/ML
0.1 INJECTION, SOLUTION EPIDURAL; INFILTRATION; INTRACAUDAL; PERINEURAL AS NEEDED
Status: CANCELLED | OUTPATIENT
Start: 2022-09-25

## 2022-09-25 RX ORDER — SODIUM CHLORIDE, SODIUM LACTATE, POTASSIUM CHLORIDE, CALCIUM CHLORIDE 600; 310; 30; 20 MG/100ML; MG/100ML; MG/100ML; MG/100ML
INJECTION, SOLUTION INTRAVENOUS
Status: DISCONTINUED | OUTPATIENT
Start: 2022-09-25 | End: 2022-09-25 | Stop reason: HOSPADM

## 2022-09-25 RX ORDER — SODIUM CHLORIDE 0.9 % (FLUSH) 0.9 %
5-40 SYRINGE (ML) INJECTION EVERY 8 HOURS
Status: DISCONTINUED | OUTPATIENT
Start: 2022-09-25 | End: 2022-09-25 | Stop reason: HOSPADM

## 2022-09-25 RX ORDER — MIDAZOLAM HYDROCHLORIDE 1 MG/ML
0.5 INJECTION, SOLUTION INTRAMUSCULAR; INTRAVENOUS
Status: DISCONTINUED | OUTPATIENT
Start: 2022-09-25 | End: 2022-09-25 | Stop reason: HOSPADM

## 2022-09-25 RX ORDER — ONDANSETRON 2 MG/ML
4 INJECTION INTRAMUSCULAR; INTRAVENOUS AS NEEDED
Status: DISCONTINUED | OUTPATIENT
Start: 2022-09-25 | End: 2022-09-25 | Stop reason: HOSPADM

## 2022-09-25 RX ORDER — LIDOCAINE HYDROCHLORIDE 20 MG/ML
INJECTION, SOLUTION EPIDURAL; INFILTRATION; INTRACAUDAL; PERINEURAL AS NEEDED
Status: DISCONTINUED | OUTPATIENT
Start: 2022-09-25 | End: 2022-09-25 | Stop reason: HOSPADM

## 2022-09-25 RX ORDER — BUPIVACAINE HYDROCHLORIDE AND EPINEPHRINE 5; 5 MG/ML; UG/ML
INJECTION, SOLUTION EPIDURAL; INTRACAUDAL; PERINEURAL AS NEEDED
Status: DISCONTINUED | OUTPATIENT
Start: 2022-09-25 | End: 2022-09-25 | Stop reason: HOSPADM

## 2022-09-25 RX ORDER — SODIUM CHLORIDE 0.9 % (FLUSH) 0.9 %
5-40 SYRINGE (ML) INJECTION AS NEEDED
Status: DISCONTINUED | OUTPATIENT
Start: 2022-09-25 | End: 2022-09-25 | Stop reason: HOSPADM

## 2022-09-25 RX ORDER — SODIUM CHLORIDE, SODIUM LACTATE, POTASSIUM CHLORIDE, CALCIUM CHLORIDE 600; 310; 30; 20 MG/100ML; MG/100ML; MG/100ML; MG/100ML
125 INJECTION, SOLUTION INTRAVENOUS CONTINUOUS
Status: CANCELLED | OUTPATIENT
Start: 2022-09-25 | End: 2022-09-26

## 2022-09-25 RX ORDER — PHENYLEPHRINE HCL IN 0.9% NACL 0.4MG/10ML
SYRINGE (ML) INTRAVENOUS AS NEEDED
Status: DISCONTINUED | OUTPATIENT
Start: 2022-09-25 | End: 2022-09-25 | Stop reason: HOSPADM

## 2022-09-25 RX ORDER — SODIUM CHLORIDE 9 MG/ML
25 INJECTION, SOLUTION INTRAVENOUS CONTINUOUS
Status: CANCELLED | OUTPATIENT
Start: 2022-09-25 | End: 2022-09-26

## 2022-09-25 RX ORDER — SODIUM CHLORIDE 0.9 % (FLUSH) 0.9 %
5-40 SYRINGE (ML) INJECTION EVERY 8 HOURS
Status: CANCELLED | OUTPATIENT
Start: 2022-09-25

## 2022-09-25 RX ADMIN — ALBUMIN (HUMAN) 250 ML: 12.5 INJECTION, SOLUTION INTRAVENOUS at 10:35

## 2022-09-25 RX ADMIN — METRONIDAZOLE 500 MG: 500 INJECTION, SOLUTION INTRAVENOUS at 14:17

## 2022-09-25 RX ADMIN — FENTANYL CITRATE 50 MCG: 50 INJECTION, SOLUTION INTRAMUSCULAR; INTRAVENOUS at 08:42

## 2022-09-25 RX ADMIN — INDOCYANINE GREEN AND WATER 1.5 MG: KIT at 08:05

## 2022-09-25 RX ADMIN — Medication 80 MCG: at 08:56

## 2022-09-25 RX ADMIN — SUCCINYLCHOLINE CHLORIDE 180 MG: 20 INJECTION, SOLUTION INTRAMUSCULAR; INTRAVENOUS at 08:43

## 2022-09-25 RX ADMIN — CEFTRIAXONE SODIUM 1 G: 1 INJECTION, POWDER, FOR SOLUTION INTRAMUSCULAR; INTRAVENOUS at 14:17

## 2022-09-25 RX ADMIN — SUGAMMADEX 200 MG: 100 INJECTION, SOLUTION INTRAVENOUS at 11:22

## 2022-09-25 RX ADMIN — FENTANYL CITRATE 50 MCG: 50 INJECTION, SOLUTION INTRAMUSCULAR; INTRAVENOUS at 09:01

## 2022-09-25 RX ADMIN — LIDOCAINE HYDROCHLORIDE 100 MG: 20 INJECTION, SOLUTION EPIDURAL; INFILTRATION; INTRACAUDAL; PERINEURAL at 08:42

## 2022-09-25 RX ADMIN — DEXAMETHASONE SODIUM PHOSPHATE 8 MG: 4 INJECTION, SOLUTION INTRAMUSCULAR; INTRAVENOUS at 08:54

## 2022-09-25 RX ADMIN — METRONIDAZOLE 500 MG: 500 INJECTION, SOLUTION INTRAVENOUS at 01:25

## 2022-09-25 RX ADMIN — ONDANSETRON HYDROCHLORIDE 4 MG: 2 INJECTION, SOLUTION INTRAMUSCULAR; INTRAVENOUS at 10:54

## 2022-09-25 RX ADMIN — FENTANYL CITRATE 25 MCG: 50 INJECTION, SOLUTION INTRAMUSCULAR; INTRAVENOUS at 12:27

## 2022-09-25 RX ADMIN — Medication 80 MCG: at 09:04

## 2022-09-25 RX ADMIN — ROCURONIUM BROMIDE 20 MG: 10 SOLUTION INTRAVENOUS at 09:35

## 2022-09-25 RX ADMIN — ROCURONIUM BROMIDE 5 MG: 10 SOLUTION INTRAVENOUS at 08:42

## 2022-09-25 RX ADMIN — HEPARIN SODIUM 5000 UNITS: 5000 INJECTION INTRAVENOUS; SUBCUTANEOUS at 17:45

## 2022-09-25 RX ADMIN — Medication 120 MCG: at 08:48

## 2022-09-25 RX ADMIN — FENTANYL CITRATE 25 MCG: 50 INJECTION, SOLUTION INTRAMUSCULAR; INTRAVENOUS at 12:32

## 2022-09-25 RX ADMIN — DEXMEDETOMIDINE HYDROCHLORIDE 4 MCG: 100 INJECTION, SOLUTION, CONCENTRATE INTRAVENOUS at 09:40

## 2022-09-25 RX ADMIN — HEPARIN SODIUM 5000 UNITS: 5000 INJECTION INTRAVENOUS; SUBCUTANEOUS at 01:25

## 2022-09-25 RX ADMIN — CEFAZOLIN 2 G: 330 INJECTION, POWDER, FOR SOLUTION INTRAMUSCULAR; INTRAVENOUS at 08:55

## 2022-09-25 RX ADMIN — PROPOFOL 150 MG: 10 INJECTION, EMULSION INTRAVENOUS at 08:42

## 2022-09-25 RX ADMIN — HYDROMORPHONE HYDROCHLORIDE 0.5 MG: 2 INJECTION, SOLUTION INTRAMUSCULAR; INTRAVENOUS; SUBCUTANEOUS at 10:10

## 2022-09-25 RX ADMIN — ROCURONIUM BROMIDE 35 MG: 10 SOLUTION INTRAVENOUS at 08:52

## 2022-09-25 RX ADMIN — SODIUM CHLORIDE, POTASSIUM CHLORIDE, SODIUM LACTATE AND CALCIUM CHLORIDE: 600; 310; 30; 20 INJECTION, SOLUTION INTRAVENOUS at 08:26

## 2022-09-25 RX ADMIN — DEXMEDETOMIDINE HYDROCHLORIDE 4 MCG: 100 INJECTION, SOLUTION, CONCENTRATE INTRAVENOUS at 09:49

## 2022-09-25 NOTE — PERIOP NOTES
TRANSFER - IN REPORT:    Verbal report received from [facility], RN(name) on Debora Gilmore  being received from 5W(unit) for ordered procedure      Report consisted of patients Situation, Background, Assessment and   Recommendations(SBAR). Information from the following report(s) SBAR, Kardex, ED Summary, Intake/Output, MAR, and Recent Results was reviewed with the receiving nurse. Opportunity for questions and clarification was provided. Assessment completed upon patients arrival to unit and care assumed.

## 2022-09-25 NOTE — PERIOP NOTES
TRANSFER - OUT REPORT:    Verbal report given to SANTA Baez(name) on Christofer Walters  being transferred to 5W(unit) for ordered procedure       Report consisted of patients Situation, Background, Assessment and   Recommendations(SBAR). Time Pre op antibiotic [id number]  Anesthesia Stop time: 4484     Information from the following report(s) SBAR, Kardex, ED Summary, Procedure Summary, Intake/Output, and MAR was reviewed with the receiving nurse. Opportunity for questions and clarification was provided. Is the patient on 02? YES       L/Min 2    Is the patient on a monitor? NO    Is the nurse transporting with the patient? YES    Surgical Waiting Area notified of patient's transfer from PACU? YES- daughter in law Annia Irwin informed of patient returning to room      The following personal items collected during your admission accompanied patient upon transfer:   Dental Appliance: Dental Appliances: None  Vision: Visual Aid: Glasses, At bedsideon 5W  Hearing Aid:    Jewelry: Jewelry: With patient transported 3 grey rings in denture cup on patient bed back to 5W  Clothing: Clothing: With patient in 5W  Other Valuables:  Other Valuables: With patient in 5W  Valuables sent to safe:

## 2022-09-25 NOTE — BRIEF OP NOTE
Brief Postoperative Note    Patient: Sam Addison  YOB: 1955  MRN: [id number]    Date of Procedure: 9/25/2022     Pre-Op Diagnosis: Cholecystitis    Post-Op Diagnosis: Severe acute cholecystitis    Procedure(s):  CHOLECYSTECTOMY LAPAROSCOPIC ROBOTIC WITH CHOLANGIOGRAM    Surgeon(s):  Shon Vega MD    Surgical Assistant: None    Anesthesia: General     Estimated Blood Loss (mL): less than 151     Complications: None    Specimens:   ID Type Source Tests Collected by Time Destination   1 : gallbladder Fresh Gallbladder  Shon Vega MD 9/25/2022 1107 Pathology        Implants: * No implants in log *    Drains:   Blanche Bajwa #1 09/25/22 Right; Lower Abdomen (Active)       Findings: Severely distended gallbladder with hydropic fluid later with bilious fluid. Severe inflammation and large liver lobe on left. Had to take gallbladder off in pieces and remove stones. Negative IOC. Had to staple cystic duct with black loads (thickest possible loads) to control cystic duct. Drain placed.     Electronically Signed by Yonas Castelan MD on 9/25/2022 at 11:13 AM

## 2022-09-25 NOTE — PROGRESS NOTES
Surgery Progress Note    9/25/2022    Admit Date: 9/23/2022    CC: Abd pain      Subjective:     RUQ pain persists. No nausea. Drank some yesterday. Constitutional: No fever or chills  Neurologic: No headache  Eyes: No scleral icterus or irritated eyes  Nose: No nasal pain or drainage  Mouth: No oral lesions or sore throat  Cardiac: No palpations or chest pain  Pulmonary: No cough or shortness of breath  Gastrointestinal: Abd pain, No nausea, emesis, diarrhea, or constipation  Genitourinary: No dysuria  Musculoskeletal: No muscle or joint tenderness  Skin: No rashes or lesions  Psychiatric: No anxiety or depressed mood    Objective:   Visit Vitals  /72   Pulse (!) 101   Temp 99.6 °F (37.6 °C)   Resp 19   SpO2 97%       General: No acute distress, conversant  Eyes: PERRLA, no scleral icterus  HENT: Normocephalic without oral lesions  Neck: Trachea midline without LAD  Cardiac: Normal pulse rate and rhythm  Pulmonary: Symmetric chest rise with normal effort  GI: Soft, TTP RUQ, ND, no hernia, no splenomegaly  Skin: Warm without rash  Extremities: No edema or joint stiffness  Psych: Appropriate mood and affect    Labs, vital signs, and I/O reviewed. Assessment:     79 y/o F with concern for acute cholecystitis    Plan:     PRN pain control  IVF  NPO  On abx. WBC normal  T bili down trending. Plan for robotic marta with IOC today. Discussed risked for bleeding, infection, biliary tree injury, retained stone, bowel injury, and risks of anesthesia. Pt agrees to proceed this AM. ICG to be given.     Chasity Allan MD  Bariatric and General Surgeon  [facility]

## 2022-09-25 NOTE — OP NOTES
OPERATIVE NOTE    Date of Procedure: 9/25/2022     Preoperative Diagnosis: Cholecystitis  Postoperative Diagnosis:  Acute Cholecystitis      Procedure: Procedure(s):  CHOLECYSTECTOMY LAPAROSCOPIC ROBOTIC WITH CHOLANGIOGRAM    Surgeon: Bianca Houston MD    Surgical Staff: Circ-1: Orlando Curtis, RN  Scrub RN-1: Arlen Miller    Anesthesia: General   Indications: 64-year-old female with morbid obesity presents with right upper quadrant pain and concern for acute cholecystitis. Also elevated bilirubin with a history of hepatitis. Taken to the OR for robotic cholecystectomy  Findings: Super morbid obesity, dense adhesions of omentum to gallbladder, extremely thickened gallbladder wall, hydropic gallbladder initially. Gallbladder removed in piecemeal along with the stones. Ultimately able to control cystic duct with black stapler loads. Negative IOC. Procedure took twice the typical operative time for acute cholecystitis with IOC with double to triple the complexity of a typical procedure given her adiposity and inflammation. Description of Operation: Erika Nix was identified in the pre-operative holding area. Informed consent was obtained after a complete discussion of risks, benefits and alternatives to surgery were had with the patient. The patient was brought back to the operating room and placed under general endotracheal anesthesia in the supine position on the operating room table with a foot board. The patient was then prepped and draped in the usual sterile fashion. A timeout was performed. We then injected local anesthetic into the breana-umbilical skin and subcutaneous tissue cephalad and to the right of the umbilicus. Using a 5 mm Optiview trocar we safely entered the abdomen. The abdomen was insufflated to 15 mm Hg. I then placed an 8 mm trocar in the left mid abdomen followed by an 8 mm trocar in the right mid abdomen and an 8 mm trocar along the right anterior axillary line. The 5 mm entry trocar size was up-sized to a 12 mm trocar. At this time I also placed a cholangiogram sheath in the RUQ. The patient was placed in steep reverse Trendelenburg. The robot was docked and we immediately noted omentum stuck over the liver. I bluntly peeled this off. The gallbladder was extremely distended and edematous. I elected to open the gallbladder up and suction out hydropic fluid. I then elevated the dome of the gallbladder up over the liver. There was significant dense adhesions to the gallbladder from the omentum and duodenum. The falciform ligament was very large given her intra-abdominal adiposity. I used a 2-0 V-Loc suture to move this out of the way and pexied to the abdominal wall. I later placed an additional 5 mm trocar for an assistant in the left upper quadrant to retract the large left lobe of the liver. Next, because the inflammation and adhesions towards the infundibulum I attempted a dome down dissection. The patient oozed significantly from all surfaces. I ensured hemostasis as I went. I started to incise the peritoneal lining of the dome of the gallbladder. This plane was not ideal.  There was a lot of bleeding. I elected to instead leave the posterior wall of the gallbladder intact. I took off the medial and lateral walls along with the anterior surface of the gallbladder. I did this all the way down taking off sections of gallbladder as I went down towards the infundibulum and cystic duct. We noted return of bile from the cystic duct. Likely a stone became dislodged hence the change of hydropic fluid to bilious fluid. I continued my dissection towards the infundibulum carefully elevating that up and away from the duodenum. I used intraoperative ICG to confirm biliary tree anatomy. Was able to elevate the infundibulum up and remove many large stones. These were later placed with the pieces of gallbladder and removed.   We then placed a cholangiogram catheter, Arrow catheter, through the previously placed sheath and into the cystic duct orifice. The balloon was inflated and held in place. I performed an intraoperative cholangiogram which showed a dilated common bile duct with a normal-sized cystic duct. There were no obvious filling defects with prompt emptying into the duodenum. Common hepatic duct appeared to be patent. We then removed the catheter and planned control of the cystic duct. The tissue was extremely edematous. I felt that clipping was not practical and I instead elected for a 45 mm stapler firing. We attempted to place a blue load on the tissue and fire it, however, the tissue was so thick this would not even clamp down. We then transitioned to a black load and fired 3 separate black loads to control the cystic duct and infundibulum of the gallbladder. We ensured hemostasis. We ensured no biliary leak. We then placed 2 pieces of surgical snow in the gallbladder fossa and placed a 19 [place] round SAMANTHA drain via the right trocar site into the gallbladder fossa to monitor for leak. This was secured with a 2-0 nylon. I removed the 2-0 V-Loc suture and needle. We then undocked the robot. I then scrubbed back in and placed all pieces of the gallbladder and gallstones into an Endo Catch bag. We closed the 12 mm trocar site with a 0 PDS suture. We then released the pneumoperitoneum and removed the 12 mm trocar. The specimen was removed via the umbilical trocar site. The PDS suture was tied down. The dermis was approximated with 4-0 Monocryl suture followed by Dermabond for the skin. At the end of the procedure all instrument, needle, and sponge counts were correct. I was present and scrubbed throughout the entirety of the case. The patient awoke from anesthesia and was extubated without complication and sent to PACU in stable condition.       Estimated Blood Loss: Less than 100 mL    Specimens:   ID Type Source Tests Collected by Time Destination   1 : gallbladder Fresh Gallbladder  Isiah Ko MD 9/25/2022 1107 Pathology        Complications: None    Implants: * No implants in log *      Kenzie York MD  Bariatric and General Surgeon  [facility]  9/25/2022

## 2022-09-25 NOTE — PROGRESS NOTES
Problem: Falls - Risk of  Goal: *Absence of Falls  Description: Document Melania Junior Fall Risk and appropriate interventions in the flowsheet.   Outcome: Progressing Towards Goal  Note: Fall Risk Interventions:

## 2022-09-25 NOTE — ANESTHESIA PREPROCEDURE EVALUATION
Relevant Problems   ENDOCRINE   (+) Obesity, morbid (HCC)       Anesthetic History   No history of anesthetic complications            Review of Systems / Medical History  Patient summary reviewed, nursing notes reviewed and pertinent labs reviewed    Pulmonary            Asthma   Pertinent negatives: No smoker     Neuro/Psych   Within defined limits           Cardiovascular    Hypertension        Dysrhythmias : SVT        Comments: 2D echo- diastolic relaxation disfunction   GI/Hepatic/Renal           Liver disease (hepatitis)     Endo/Other        Morbid obesity     Other Findings   Comments: BMI 47           Physical Exam    Airway  Mallampati: III  TM Distance: 4 - 6 cm  Neck ROM: normal range of motion, short neck        Cardiovascular    Rhythm: regular  Rate: normal         Dental         Pulmonary  Breath sounds clear to auscultation               Abdominal         Other Findings            Anesthetic Plan    ASA: 3  Anesthesia type: general          Induction: Intravenous  Anesthetic plan and risks discussed with: Patient      Chio Room

## 2022-09-25 NOTE — ANESTHESIA POSTPROCEDURE EVALUATION
PHYSICIAN NEXT STEPS:   Review Only      CHIEF COMPLAINT:   Chief Complaint/Protocol Used: PT/INR   Onset: N/A         ASSESSMENT:   -------------------------------------------------------      DISPOSITION:   Disposition Recommendation: Unspecified   Patient Directed To: Unspecified   Patient Intended Action: Unspecified      radha¢ PT 24.7      DISPOSITION OVERRIDE/PROVIDER CONSULT:   Disposition Override: N/A   Override Source: Unspecified   Consulted with PCP: No   Consulted with On-Call Physician: No         ENCOUNTER STARTED:   11/01/17 04:03:39 PM   ENCOUNTER ASSIGNED TO/CLOSED BY:   Logan Ordoñez @ 11/01/17 04:08:48 PM         -------------------------------------------------------      UNDERSTANDS CARE ADVICE: No      AGREES WITH CARE ADVICE: No      WILL FOLLOW CARE ADVICE: No      -------------------------------------------------------   Procedure(s):  CHOLECYSTECTOMY LAPAROSCOPIC ROBOTIC WITH CHOLANGIOGRAM.    general    Anesthesia Post Evaluation        Patient participation: complete - patient participated  Level of consciousness: awake  Pain management: adequate  Airway patency: patent  Anesthetic complications: no  Cardiovascular status: hemodynamically stable  Respiratory status: acceptable  Hydration status: acceptable  Comments: The patient is ready for PACU discharge. Val Zhang DO                   Post anesthesia nausea and vomiting:  controlled      INITIAL Post-op Vital signs:   Vitals Value Taken Time   /64 09/25/22 1210   Temp 36.7 °C (98.1 °F) 09/25/22 1140   Pulse 91 09/25/22 1212   Resp 12 09/25/22 1212   SpO2 88 % 09/25/22 1212   Vitals shown include unvalidated device data.

## 2022-09-25 NOTE — PERIOP NOTES
SURGICEL SNoW WAS GIVEN TO THE STERILE FIELD TO BE USED BY MD DURING PROCEDURE  REF: [id number]  LOT: [id number]  EXP: 12/31/2023

## 2022-09-25 NOTE — ROUTINE PROCESS
TRANSFER - IN REPORT:    Verbal report received from Maria Isabel(name) on [address]  being received from K[place]) for routine progression of care      Report consisted of patients Situation, Background, Assessment and   Recommendations(SBAR). Information from the following report(s) SBAR and Intake/Output was reviewed with the receiving nurse. Opportunity for questions and clarification was provided. Assessment completed upon patients arrival to unit and care assumed.

## 2022-09-25 NOTE — ROUTINE PROCESS
Patient: Natalie Page MRN: [id number]  SSN: [id number]   YOB: 1955  Age: 78 y.o. Sex: female     Patient is status post Procedure(s):  CHOLECYSTECTOMY LAPAROSCOPIC ROBOTIC WITH CHOLANGIOGRAM.    Surgeon(s) and Role:     * Benny Coon MD - Primary    Local/Dose/Irrigation: SEE MAR                  Peripheral IV 09/24/22 Left Antecubital (Active)   Site Assessment Clean, dry, & intact 09/24/22 2000   Phlebitis Assessment 0 09/24/22 2000   Infiltration Assessment 0 09/24/22 2000   Dressing Status Clean, dry, & intact 09/24/22 2000   Dressing Type Transparent 09/24/22 2000   Hub Color/Line Status Infusing 09/24/22 2000   Action Taken Open ports on tubing capped 09/24/22 2000   Alcohol Cap Used Yes 09/24/22 2000          Hema Vicente #1 09/25/22 Right; Lower Abdomen (Active)                     Dressing/Packing:  Incision 09/25/22 Abdomen-Dressing/Treatment: Skin glue (09/25/22 1110)

## 2022-09-25 NOTE — PROGRESS NOTES
Hospitalist Progress Note      Hospital summary: Natalie Page is a 78 y.o. female who presents with abdominal pain. Patient reports abdominal pain is started on Tuesday evening. Moderate pain. She thought it was a gas, took over-the-counter medication no improvement. Next days feeling the same went to emergency room, had CT scan of abdomen diagnosed with gallstone disease. She was advised needs cholecystectomy. Transferred here for further evaluation and treatment. Patient reports in 1996 was diagnosed with cirrhosis, had elevated liver enzyme but normalized. No history of EtOH. Upon arrival in the emergency room CT scan of abdomen showed gallbladder with wall thickness, likely cirrhosis and gallstone. Patient reports taking medication for asthma anxiety she has osteoarthritis taking Lortab. Taking verapamil 120 mg for rapid heart rate. 9/23/2022      Assessment/Plan:  Acute Cholecystitis s/p lap marta 9/25   - Pt presented with abd pain. Leukocytosis on poa - resolved   - CT abd: Cirrhosis is suspected. No hepatic mass, intrahepatic or ureteral dilation or extrahepatic bile duct dilation. At least 2 gallstones in the gallbladder neck, the larger measuring 18 mm. Gallbladder mildly distended  - US abd; Gallbladder distention with intraluminal calculi and a possible 17 mm calculus lodged in the gallbladder neck. Correlate for acute cholecystitis. Common bile duct dilatation without visualized choledocholithiasis. - appreciate Gen surgery input  - c/w IV Ceftriaxone and flagyl   -  Severely distended gallbladder. Severe inflammation and large liver lobe on left. Drain placed. Hx Liver cirrhosis - not on any meds   Asthma -stable , c/w brovana/ pulmicort   History of tachyarrhythmia - c/w home verapamil     Code status: Full  DVT prophylaxis: SCDs  Disposition: TBD.  Home likely when ready   ----------------------------------------------    CC: Abd pain     S: Patient is seen and examined at bedside. She underwent lap marta today, tolerated well. Pain controlled. Discussed with nursing     Review of Systems:  A comprehensive review of systems was negative. O:  Visit Vitals  /84 (BP 1 Location: Right upper arm, BP Patient Position: At rest;Lying)   Pulse 79   Temp 97.6 °F (36.4 °C)   Resp 16   SpO2 94%       PHYSICAL EXAM:  Gen: NAD  HEENT: anicteric sclerae, normal conjunctiva, oropharynx clear, MM moist  Neck: supple, trachea midline, no adenopathy  Heart: RRR, no MRG, no JVD, no peripheral edema  Lungs: CTA b/l, non-labored respirations  Abd: soft, surgical site intact,RUQ drain, ND, BS+, no organomegaly  Extr: warm  Skin: dry, no rash  Neuro: CN II-XII grossly intact, normal speech, moves all extremities  Psych: normal mood, appropriate affect      Intake/Output Summary (Last 24 hours) at 9/25/2022 1541  Last data filed at 9/25/2022 1035  Gross per 24 hour   Intake 250 ml   Output --   Net 250 ml          Recent labs & imaging reviewed:  Recent Results (from the past 24 hour(s))   METABOLIC PANEL, COMPREHENSIVE    Collection Time: 09/25/22  4:00 AM   Result Value Ref Range    Sodium 136 136 - 145 mmol/L    Potassium 3.4 (L) 3.5 - 5.1 mmol/L    Chloride 105 97 - 108 mmol/L    CO2 25 21 - 32 mmol/L    Anion gap 6 5 - 15 mmol/L    Glucose 115 (H) 65 - 100 mg/dL    BUN 6 6 - 20 MG/DL    Creatinine 0.84 0.55 - 1.02 MG/DL    BUN/Creatinine ratio 7 (L) 12 - 20      GFR est AA >60 >60 ml/min/1.73m2    GFR est non-AA >60 >60 ml/min/1.73m2    Calcium 8.4 (L) 8.5 - 10.1 MG/DL    Bilirubin, total 1.2 (H) 0.2 - 1.0 MG/DL    ALT (SGPT) 26 12 - 78 U/L    AST (SGOT) 20 15 - 37 U/L    Alk.  phosphatase 90 45 - 117 U/L    Protein, total 7.2 6.4 - 8.2 g/dL    Albumin 2.6 (L) 3.5 - 5.0 g/dL    Globulin 4.6 (H) 2.0 - 4.0 g/dL    A-G Ratio 0.6 (L) 1.1 - 2.2     CBC WITH AUTOMATED DIFF    Collection Time: 09/25/22  4:00 AM   Result Value Ref Range    WBC 9.3 3.6 - 11.0 K/uL    RBC 4.09 3.80 - 5.20 M/uL    HGB 9.5 (L) 11.5 - 16.0 g/dL    HCT 32.5 (L) 35.0 - 47.0 %    MCV 79.5 (L) 80.0 - 99.0 FL    MCH 23.2 (L) 26.0 - 34.0 PG    MCHC 29.2 (L) 30.0 - 36.5 g/dL    RDW 18.2 (H) 11.5 - 14.5 %    PLATELET 174 675 - 653 K/uL    MPV 9.8 8.9 - 12.9 FL    NRBC 0.2 (H) 0  WBC    ABSOLUTE NRBC 0.02 (H) 0.00 - 0.01 K/uL    NEUTROPHILS 80 (H) 32 - 75 %    LYMPHOCYTES 13 12 - 49 %    MONOCYTES 6 5 - 13 %    EOSINOPHILS 0 0 - 7 %    BASOPHILS 0 0 - 1 %    IMMATURE GRANULOCYTES 1 (H) 0.0 - 0.5 %    ABS. NEUTROPHILS 7.4 1.8 - 8.0 K/UL    ABS. LYMPHOCYTES 1.2 0.8 - 3.5 K/UL    ABS. MONOCYTES 0.6 0.0 - 1.0 K/UL    ABS. EOSINOPHILS 0.0 0.0 - 0.4 K/UL    ABS. BASOPHILS 0.0 0.0 - 0.1 K/UL    ABS. IMM. GRANS. 0.1 (H) 0.00 - 0.04 K/UL    DF AUTOMATED       Recent Labs     09/25/22  0400 09/24/22  0022   WBC 9.3 9.1   HGB 9.5* 8.8*   HCT 32.5* 29.9*    325       Recent Labs     09/25/22  0400 09/24/22  0022 09/23/22  0500    136 136   K 3.4* 3.3* 3.9    104 103   CO2 25 25 25   BUN 6 10 11   CREA 0.84 0.78 0.81   * 89 100   CA 8.4* 8.5 8.4*       Recent Labs     09/25/22  0400 09/24/22  0022 09/23/22  0500 09/22/22  1840   ALT 26 25 23 27   AP 90 88 78 83   TBILI 1.2* 1.3* 1.9* 2.0*   TP 7.2 6.9 6.6 7.3   ALB 2.6* 2.8* 2.8* 3.3*   GLOB 4.6* 4.1* 3.8 4.0   LPSE  --   --   --  21*       Recent Labs     09/22/22  1840   INR 1.1   PTP 11.1        No results for input(s): FE, TIBC, PSAT, FERR in the last 72 hours. No results found for: FOL, RBCF   No results for input(s): PH, PCO2, PO2 in the last 72 hours. No results for input(s): CPK, CKNDX, TROIQ in the last 72 hours.     No lab exists for component: CPKMB  Lab Results   Component Value Date/Time    Cholesterol, total 181 09/18/2020 08:59 AM    HDL Cholesterol 55 09/18/2020 08:59 AM    LDL, calculated 110.6 (H) 09/18/2020 08:59 AM    Triglyceride 77 09/18/2020 08:59 AM     No results found for: [facility]  Lab Results   Component Value Date/Time    Color YELLOW/STRAW 09/23/2022 05:25 AM    Appearance CLEAR 09/23/2022 05:25 AM    Specific gravity 1.025 09/23/2022 05:25 AM    pH (UA) 6.0 09/23/2022 05:25 AM    Protein TRACE (A) 09/23/2022 05:25 AM    Glucose Negative 09/23/2022 05:25 AM    Ketone Negative 09/23/2022 05:25 AM    Bilirubin Negative 09/23/2022 05:25 AM    Urobilinogen 1.0 09/23/2022 05:25 AM    Nitrites Negative 09/23/2022 05:25 AM    Leukocyte Esterase Negative 09/23/2022 05:25 AM    Epithelial cells MODERATE (A) 09/23/2022 05:25 AM    Bacteria 1+ (A) 09/23/2022 05:25 AM    WBC 0-4 09/23/2022 05:25 AM    RBC 5-10 09/23/2022 05:25 AM       Med list reviewed  Current Facility-Administered Medications   Medication Dose Route Frequency    HYDROmorphone (DILAUDID) injection 1 mg  1 mg IntraVENous Q2H PRN    oxyCODONE-acetaminophen (PERCOCET 10)  mg per tablet 1 Tablet  1 Tablet Oral Q4H PRN    oxyCODONE-acetaminophen (PERCOCET) 5-325 mg per tablet 1 Tablet  1 Tablet Oral Q4H PRN    verapamil ER (CALAN-SR) tablet 120 mg  120 mg Oral DAILY    cefTRIAXone (ROCEPHIN) 1 g in 0.9% sodium chloride 10 mL IV syringe  1 g IntraVENous Q24H    heparin (porcine) injection 5,000 Units  5,000 Units SubCUTAneous Q8H    metroNIDAZOLE (FLAGYL) IVPB premix 500 mg  500 mg IntraVENous Q12H    ALPRAZolam (XANAX) tablet 0.5 mg  0.5 mg Oral BID PRN    arformoteroL (BROVANA) neb solution 15 mcg  15 mcg Nebulization BID RT    And    budesonide (PULMICORT) 250 mcg/2ml nebulizer susp  500 mcg Nebulization BID RT       Care Plan discussed with:  Patient/Family, Nurse, and     Gurvinder Curiel MD  Internal Medicine  Date of Service: 9/25/2022

## 2022-09-26 LAB
ALBUMIN SERPL-MCNC: 2.5 G/DL (ref 3.5–5)
ALBUMIN/GLOB SERPL: 0.7 {RATIO} (ref 1.1–2.2)
ALP SERPL-CCNC: 84 U/L (ref 45–117)
ALT SERPL-CCNC: 32 U/L (ref 12–78)
ANION GAP SERPL CALC-SCNC: 5 MMOL/L (ref 5–15)
AST SERPL-CCNC: 35 U/L (ref 15–37)
BASOPHILS # BLD: 0 K/UL (ref 0–0.1)
BASOPHILS NFR BLD: 0 % (ref 0–1)
BILIRUB SERPL-MCNC: 0.5 MG/DL (ref 0.2–1)
BUN SERPL-MCNC: 11 MG/DL (ref 6–20)
BUN/CREAT SERPL: 16 (ref 12–20)
CALCIUM SERPL-MCNC: 7.8 MG/DL (ref 8.5–10.1)
CHLORIDE SERPL-SCNC: 105 MMOL/L (ref 97–108)
CO2 SERPL-SCNC: 27 MMOL/L (ref 21–32)
CREAT SERPL-MCNC: 0.69 MG/DL (ref 0.55–1.02)
DIFFERENTIAL METHOD BLD: ABNORMAL
EOSINOPHIL # BLD: 0 K/UL (ref 0–0.4)
EOSINOPHIL NFR BLD: 0 % (ref 0–7)
ERYTHROCYTE [DISTWIDTH] IN BLOOD BY AUTOMATED COUNT: 18.2 % (ref 11.5–14.5)
GLOBULIN SER CALC-MCNC: 3.7 G/DL (ref 2–4)
GLUCOSE SERPL-MCNC: 152 MG/DL (ref 65–100)
HCT VFR BLD AUTO: 28.3 % (ref 35–47)
HGB BLD-MCNC: 8.4 G/DL (ref 11.5–16)
IMM GRANULOCYTES # BLD AUTO: 0.1 K/UL (ref 0–0.04)
IMM GRANULOCYTES NFR BLD AUTO: 1 % (ref 0–0.5)
LYMPHOCYTES # BLD: 0.4 K/UL (ref 0.8–3.5)
LYMPHOCYTES NFR BLD: 5 % (ref 12–49)
MCH RBC QN AUTO: 23.7 PG (ref 26–34)
MCHC RBC AUTO-ENTMCNC: 29.7 G/DL (ref 30–36.5)
MCV RBC AUTO: 79.7 FL (ref 80–99)
MONOCYTES # BLD: 0.2 K/UL (ref 0–1)
MONOCYTES NFR BLD: 3 % (ref 5–13)
NEUTS SEG # BLD: 6.7 K/UL (ref 1.8–8)
NEUTS SEG NFR BLD: 91 % (ref 32–75)
NRBC # BLD: 0 K/UL (ref 0–0.01)
NRBC BLD-RTO: 0 PER 100 WBC
PLATELET # BLD AUTO: 325 K/UL (ref 150–400)
PMV BLD AUTO: 9.6 FL (ref 8.9–12.9)
POTASSIUM SERPL-SCNC: 4.1 MMOL/L (ref 3.5–5.1)
PROT SERPL-MCNC: 6.2 G/DL (ref 6.4–8.2)
RBC # BLD AUTO: 3.55 M/UL (ref 3.8–5.2)
RBC MORPH BLD: ABNORMAL
SODIUM SERPL-SCNC: 137 MMOL/L (ref 136–145)
WBC # BLD AUTO: 7.4 K/UL (ref 3.6–11)

## 2022-09-26 PROCEDURE — 74011250637 HC RX REV CODE- 250/637: Performed by: SURGERY

## 2022-09-26 PROCEDURE — 97535 SELF CARE MNGMENT TRAINING: CPT

## 2022-09-26 PROCEDURE — 74011250636 HC RX REV CODE- 250/636: Performed by: SURGERY

## 2022-09-26 PROCEDURE — 97165 OT EVAL LOW COMPLEX 30 MIN: CPT

## 2022-09-26 PROCEDURE — 85025 COMPLETE CBC W/AUTO DIFF WBC: CPT

## 2022-09-26 PROCEDURE — 97161 PT EVAL LOW COMPLEX 20 MIN: CPT

## 2022-09-26 PROCEDURE — 97116 GAIT TRAINING THERAPY: CPT

## 2022-09-26 PROCEDURE — 36415 COLL VENOUS BLD VENIPUNCTURE: CPT

## 2022-09-26 PROCEDURE — 99024 POSTOP FOLLOW-UP VISIT: CPT | Performed by: SURGERY

## 2022-09-26 PROCEDURE — 74011000250 HC RX REV CODE- 250: Performed by: SURGERY

## 2022-09-26 PROCEDURE — 80053 COMPREHEN METABOLIC PANEL: CPT

## 2022-09-26 PROCEDURE — 65270000029 HC RM PRIVATE

## 2022-09-26 PROCEDURE — 94640 AIRWAY INHALATION TREATMENT: CPT

## 2022-09-26 RX ADMIN — BUDESONIDE 500 MCG: 0.25 INHALANT RESPIRATORY (INHALATION) at 08:27

## 2022-09-26 RX ADMIN — HEPARIN SODIUM 5000 UNITS: 5000 INJECTION INTRAVENOUS; SUBCUTANEOUS at 09:13

## 2022-09-26 RX ADMIN — BUDESONIDE 500 MCG: 0.25 INHALANT RESPIRATORY (INHALATION) at 19:44

## 2022-09-26 RX ADMIN — METRONIDAZOLE 500 MG: 500 INJECTION, SOLUTION INTRAVENOUS at 01:07

## 2022-09-26 RX ADMIN — HEPARIN SODIUM 5000 UNITS: 5000 INJECTION INTRAVENOUS; SUBCUTANEOUS at 01:07

## 2022-09-26 RX ADMIN — CEFTRIAXONE SODIUM 1 G: 1 INJECTION, POWDER, FOR SOLUTION INTRAMUSCULAR; INTRAVENOUS at 14:27

## 2022-09-26 RX ADMIN — ARFORMOTEROL TARTRATE 15 MCG: 15 SOLUTION RESPIRATORY (INHALATION) at 19:44

## 2022-09-26 RX ADMIN — VERAPAMIL HYDROCHLORIDE 120 MG: 120 TABLET, FILM COATED, EXTENDED RELEASE ORAL at 09:13

## 2022-09-26 RX ADMIN — ARFORMOTEROL TARTRATE 15 MCG: 15 SOLUTION RESPIRATORY (INHALATION) at 08:27

## 2022-09-26 RX ADMIN — METRONIDAZOLE 500 MG: 500 INJECTION, SOLUTION INTRAVENOUS at 14:27

## 2022-09-26 RX ADMIN — HEPARIN SODIUM 5000 UNITS: 5000 INJECTION INTRAVENOUS; SUBCUTANEOUS at 17:57

## 2022-09-26 NOTE — PROGRESS NOTES
Surgery Progress Note    9/26/2022    Admit Date: 9/23/2022    CC: Abd pain    9/25 Robotic marta with IOC      Subjective:     Expected postoperative pain but doing well. Tolerating diet. Constitutional: No fever or chills  Neurologic: No headache  Eyes: No scleral icterus or irritated eyes  Nose: No nasal pain or drainage  Mouth: No oral lesions or sore throat  Cardiac: No palpations or chest pain  Pulmonary: No cough or shortness of breath  Gastrointestinal: Abd pain, No nausea, emesis, diarrhea, or constipation  Genitourinary: No dysuria  Musculoskeletal: No muscle or joint tenderness  Skin: No rashes or lesions  Psychiatric: No anxiety or depressed mood    Objective:   Visit Vitals  /76 (BP 1 Location: Right upper arm, BP Patient Position: Semi fowlers)   Pulse 87   Temp 98 °F (36.7 °C)   Resp 16   SpO2 97%       General: No acute distress, conversant  Eyes: PERRLA, no scleral icterus  HENT: Normocephalic without oral lesions  Neck: Trachea midline without LAD  Cardiac: Normal pulse rate and rhythm  Pulmonary: Symmetric chest rise with normal effort  GI: Soft, wounds clean dry and intact, drain is bloody but no signs of bilious drainage. Skin: Warm without rash  Extremities: No edema or joint stiffness  Psych: Appropriate mood and affect    Labs, vital signs, and I/O reviewed. Assessment:     77 y/o F with concern for acute cholecystitis post robotic marta with IOC    Plan:     PRN pain control  IVF  Reg diet  On abx. WBC normal. Cont abx until DC given inflammation  T bili normal  Cont heparin  Cont drain. At moderate risk for leak given inflamed tissue.   Ambulate  Possibly home tomorrow    Cat Hdoges MD  Bariatric and General Surgeon  [facility]

## 2022-09-26 NOTE — PROGRESS NOTES
PHYSICAL THERAPY EVALUATION/DISCHARGE  Patient: Gabriella Perez (41 y.o. female)  Date: 9/26/2022  Primary Diagnosis: Cholecystitis [K81.9]  Procedure(s) (LRB):  CHOLECYSTECTOMY LAPAROSCOPIC ROBOTIC WITH CHOLANGIOGRAM (N/A) 1 Day Post-Op   Precautions: falls         ASSESSMENT  Based on the objective data described below, the patient presents with cholecysitis and s/p lap marta POD #1. Pt participates in gait training for 150ft with navigating steps well x3, patient is at or near baseline mobility. Educated patient regarding proper mobility and frequently standing, gait training to mobilize abdomen and decrease risk of adhesions. Pt also educated on effects of uncontrolled BG levels on healing as patient asking multiple questions regarding nutrition, deferred to nutrition for general questions on nutrition    Functional Outcome Measure: The patient scored 85/100 on the barthel outcome measure which is indicative of low complexity. Other factors to consider for discharge:      Further skilled acute physical therapy is not indicated at this time. PLAN :  Recommendation for discharge: (in order for the patient to meet his/her long term goals)  Physical therapy at least 2 days/week in the home if patient desires for outdoor and community mobilization, home safety eval    This discharge recommendation:  Has been made in collaboration with the attending provider and/or case management    IF patient discharges home will need the following DME: none       SUBJECTIVE:   Patient stated I dont hurt, im just sore.     OBJECTIVE DATA SUMMARY:   HISTORY:    Past Medical History:   Diagnosis Date    Asthma     Hepatitis     auto immune    Menopause     SVT (supraventricular tachycardia) ([facility] [address]      Past Surgical History:   Procedure Laterality Date    COLONOSCOPY N/A 12/12/2019    COLONOSCOPY performed by Jolanta Brizuela MD at [address] [facility] Left 2/2009    abscessed tissue    HX CARPAL TUNNEL RELEASE Bilateral     HX CATARACT REMOVAL Bilateral     HX COLONOSCOPY  12/2011    adenomatous polyps x 2    HX ORTHOPAEDIC Left 2006    foot       Prior level of function: independent  Personal factors and/or comorbidities impacting plan of care:     Home Situation  Home Environment: Private residence  # Steps to Enter: 3  Rails to Enter: Yes  Hand Rails : Bilateral (far apart)  One/Two Story Residence: One story  Living Alone: No  Support Systems: Other Family Member(s), Spouse/Significant Other  Patient Expects to be Discharged to[de-identified] Home  Current DME Used/Available at Home: Cane, straight  Tub or Shower Type: Tub/Shower combination    EXAMINATION/PRESENTATION/DECISION MAKING:   Critical Behavior:  Neurologic State: Alert, Appropriate for age  Orientation Level: Oriented X4  Cognition: Follows commands, Appropriate safety awareness, Appropriate for age attention/concentration, Appropriate decision making  Safety/Judgement: Awareness of environment, Fall prevention, Good awareness of safety precautions, Home safety, Insight into deficits  Hearing: Auditory  Auditory Impairment: None  Skin:  intact  Edema: none   Range Of Motion:  AROM: Within functional limits           PROM: Within functional limits           Strength:    Strength:  Within functional limits                    Tone & Sensation:   Tone: Normal              Sensation: Intact               Coordination:  Coordination: Within functional limits  Vision:   Diplopia: No  Corrective Lenses: Reading glasses  Functional Mobility:  Bed Mobility:  Rolling: Independent  Supine to Sit: Independent  Sit to Supine: Independent  Scooting: Independent  Transfers:  Sit to Stand: Independent  Stand to Sit: Independent  Stand Pivot Transfers: Independent                    Balance:   Sitting: Intact  Standing: Intact  Ambulation/Gait Training:  Distance (ft): 150 Feet (ft)  Assistive Device: Gait belt  Ambulation - Level of Assistance: Independent     Gait Description (WDL): Exceptions to WDL  Gait Abnormalities: Altered arm swing;Decreased step clearance;Trunk sway increased        Base of Support: Widened;Center of gravity altered     Speed/Margi: Slow  Step Length: Left shortened                     Stairs:  Number of Stairs Trained: 3  Stairs - Level of Assistance: Modified independent   Rail Use: Right     Functional Measure:  Barthel Index:    Bathin  Bladder: 10  Bowels: 10  Groomin  Dressing: 10  Feeding: 10  Mobility: 10  Stairs: 5  Toilet Use: 10  Transfer (Bed to Chair and Back): 15  Total: 85/100       The Barthel ADL Index: Guidelines  1. The index should be used as a record of what a patient does, not as a record of what a patient could do. 2. The main aim is to establish degree of independence from any help, physical or verbal, however minor and for whatever reason. 3. The need for supervision renders the patient not independent. 4. A patient's performance should be established using the best available evidence. Asking the patient, friends/relatives and nurses are the usual sources, but direct observation and common sense are also important. However direct testing is not needed. 5. Usually the patient's performance over the preceding 24-48 hours is important, but occasionally longer periods will be relevant. 6. Middle categories imply that the patient supplies over 50 per cent of the effort. 7. Use of aids to be independent is allowed. Score Interpretation (from [address] [place] [address])    Independent   60-79 Minimally independent   40-59 Partially dependent   20-39 Very dependent   <20 Totally dependent     -Dominic Stevens., Barthel, D.W. (1965). Functional evaluation: the Barthel Index. [address] [facility] ([address] (1997). The Barthel activities of daily living index: self-reporting versus actual performance in the old (> or = 75 years).  Journal of [address] [place] [address] 45(7), [address] [facility], KAMAR, Zoë [place], Alana Mariee.Heriberto. (1999). Measuring the change in disability after inpatient rehabilitation; comparison of the responsiveness of the Barthel Index and Functional Marks Measure. Journal of Neurology, Neurosurgery, and Psychiatry, 66(4), 826-876. INNA Rodriguez, CURT Sanders, & Danny Aguirre M.A. (2004) Assessment of post-stroke quality of life in cost-effectiveness studies: The usefulness of the Barthel Index and the EuroQoL-5D. Quality of Life Research, 15, 724-73            Physical Therapy Evaluation Charge Determination   History Examination Presentation Decision-Making   HIGH Complexity :3+ comorbidities / personal factors will impact the outcome/ POC  LOW Complexity : 1-2 Standardized tests and measures addressing body structure, function, activity limitation and / or participation in recreation  LOW Complexity : Stable, uncomplicated  Other outcome measures barthel  LOW       Based on the above components, the patient evaluation is determined to be of the following complexity level: LOW     Pain Rating:  Denies pain    Activity Tolerance:   Good and Fair      After treatment patient left in no apparent distress:   Sitting in chair and Call bell within reach    COMMUNICATION/EDUCATION:   The patients plan of care was discussed with: Registered nurse and Case management. Fall prevention education was provided and the patient/caregiver indicated understanding. and Patient/family have participated as able in goal setting and plan of care.     Thank you for this referral.  Tamy Goss, PT   Time Calculation: 20 mins

## 2022-09-26 NOTE — PROGRESS NOTES
OCCUPATIONAL THERAPY EVALUATION/DISCHARGE  Patient: Bobbi Daigle (5 y.o. female)  Date: 9/26/2022  Primary Diagnosis: Cholecystitis [K81.9]  Procedure(s) (LRB):  CHOLECYSTECTOMY LAPAROSCOPIC ROBOTIC WITH CHOLANGIOGRAM (N/A) 1 Day Post-Op   Precautions:        ASSESSMENT  Based on the objective data described below, the patient presents with mild MONET with activity (appears to be baseline), no c/o pain with activity, intact functional standing balance, mildly decreased functional reach to feet, following cholecystectomy POD 1. Pt received in bed supine, alert and oriented x4. Pt very pleasant and agreeable to OT evaluation. Noted small amount of drainage from drain site on gown; RN notified who then applied dressing prior to OOB evaluation. Pt did require increased time for bed mobility with bed rail, however, performed Linus. Pt with slight difficulty reaching distally to feet; educated pt on indication of reacher to don underpants/pants (pt has AE at home); pt demonstrated excellent understanding of AE training. Pt completed functional transfers SBA, ambulated to bathroom with no AD and no LOB, completed toileting Linus to SBA. Pt lives with family at baseline who can assist as needed with IADLs and footwear. No further acute OT needs identified at this time. PT and RN notified of pt performance. Pt left OOB in chair with all needs met. Current Level of Function (ADLs/self-care): IND to SBA    Functional Outcome Measure: The patient scored 85/100 on the Barthel Index outcome measure which is indicative of independent. Other factors to consider for discharge: none     PLAN :  Recommend with staff: up ad kervin    Recommendation for discharge: (in order for the patient to meet his/her long term goals)  No skilled occupational therapy/ follow up rehabilitation needs identified at this time.     This discharge recommendation:  Has been made in collaboration with the attending provider and/or case management    IF patient discharges home will need the following DME: none       SUBJECTIVE:   Patient stated Thanks for teaching me some tricks this morning.     OBJECTIVE DATA SUMMARY:   HISTORY:   Past Medical History:   Diagnosis Date    Asthma     Hepatitis     auto immune    Menopause     SVT (supraventricular tachycardia) ([facility] [address]      Past Surgical History:   Procedure Laterality Date    COLONOSCOPY N/A 12/12/2019    COLONOSCOPY performed by Rhiannon Galaviz MD at [address] [place] Left 2/2009    abscessed tissue    HX CARPAL TUNNEL RELEASE Bilateral     HX CATARACT REMOVAL Bilateral     HX COLONOSCOPY  12/2011    adenomatous polyps x 2    HX ORTHOPAEDIC Left 2006    foot       Prior Level of Function/Environment/Context: Pt IND with ADL/IADLs, no reported falls. Retired. Lives in [place], [place]. Expanded or extensive additional review of patient history:   Home Situation  Home Environment: Private residence  # Steps to Enter: 3  Rails to Enter: Yes  Hand Rails : Bilateral (far apart)  One/Two Story Residence: One story  Living Alone: No  Support Systems: Other Family Member(s), Spouse/Significant Other  Patient Expects to be Discharged to[de-identified] Home  Current DME Used/Available at Home: Cane, straight  Tub or Shower Type: Tub/Shower combination    Hand dominance: Right    EXAMINATION OF PERFORMANCE DEFICITS:  Cognitive/Behavioral Status:  Neurologic State: Alert; Appropriate for age  Orientation Level: Oriented X4  Cognition: Follows commands; Appropriate safety awareness; Appropriate for age attention/concentration; Appropriate decision making  Perception: Appears intact  Perseveration: No perseveration noted  Safety/Judgement: Awareness of environment; Fall prevention;Good awareness of safety precautions; Home safety; Insight into deficits    Skin: gown and dressing at drain site slightly saturated, RN notified    Edema: none noted in BUEs    Hearing:   Auditory  Auditory Impairment: None    Vision/Perceptual: Diplopia: No         Corrective Lenses: Reading glasses    Range of Motion:    AROM: Within functional limits  PROM: Within functional limits                      Strength:    Strength: Within functional limits                Coordination:  Coordination: Within functional limits  Fine Motor Skills-Upper: Left Intact; Right Intact    Gross Motor Skills-Upper: Left Intact; Right Intact    Tone & Sensation:    Tone: Normal  Sensation: Intact                      Balance:  Sitting: Intact  Standing: Intact    Functional Mobility and Transfers for ADLs:  Bed Mobility:  Supine to Sit: Modified independent    Transfers:  Sit to Stand: Stand-by assistance  Toilet Transfer : Stand-by assistance    ADL Assessment:  Feeding: Independent    Oral Facial Hygiene/Grooming: Independent    Bathing: Stand-by assistance         Upper Body Dressing: Setup    Lower Body Dressing: Setup (reacher)    Toileting: Stand by assistance                ADL Intervention and task modifications:        Pt educated on reacher to utilize with LB distal dressing due to body habitus and drain at R of abdomen. Pt demonstrated excellent understanding of education and training. Cognitive Retraining  Safety/Judgement: Awareness of environment; Fall prevention;Good awareness of safety precautions; Home safety; Insight into deficits    Functional Measure:    Barthel Index:  Bathin  Bladder: 10  Bowels: 10  Groomin  Dressing: 10  Feeding: 10  Mobility: 10  Stairs: 5  Toilet Use: 10  Transfer (Bed to Chair and Back): 15  Total: 85/100      The Barthel ADL Index: Guidelines  1. The index should be used as a record of what a patient does, not as a record of what a patient could do. 2. The main aim is to establish degree of independence from any help, physical or verbal, however minor and for whatever reason. 3. The need for supervision renders the patient not independent.   4. A patient's performance should be established using the best available evidence. Asking the patient, friends/relatives and nurses are the usual sources, but direct observation and common sense are also important. However direct testing is not needed. 5. Usually the patient's performance over the preceding 24-48 hours is important, but occasionally longer periods will be relevant. 6. Middle categories imply that the patient supplies over 50 per cent of the effort. 7. Use of aids to be independent is allowed. Score Interpretation (from [facility])    Independent   60-79 Minimally independent   40-59 Partially dependent   20-39 Very dependent   <20 Totally dependent     -Dominic Stevens., Barthel, D.W. (1965). Functional evaluation: the Barthel Index. [address] ([address] [facility] [address] (1997). The Barthel activities of daily living index: self-reporting versus actual performance in the old (> or = 75 years). Journal of [address] [place] [address] 45(7), [facility], XANDER, Greer Velez., Kylah Dupree. (1999). Measuring the change in disability after inpatient rehabilitation; comparison of the responsiveness of the Barthel Index and Functional [place] Measure. Journal of Neurology, Neurosurgery, and Psychiatry, 66(4), 466-730. Alexandria De Los Santos, N.J.A, CURT Sanders, & Justyn Wood MNickoA. (2004) Assessment of post-stroke quality of life in cost-effectiveness studies: The usefulness of the Barthel Index and the EuroQoL-5D.  Quality of Life Research, 15, 258-37         Occupational Therapy Evaluation Charge Determination   History Examination Decision-Making   LOW Complexity : Brief history review  LOW Complexity : 1-3 performance deficits relating to physical, cognitive , or psychosocial skils that result in activity limitations and / or participation restrictions  LOW Complexity : No comorbidities that affect functional and no verbal or physical assistance needed to complete eval tasks       Based on the above components, the patient evaluation is determined to be of the following complexity level: LOW   Pain Rating:  No c/o pain at rest or with activity/    Activity Tolerance:   Good    After treatment patient left in no apparent distress:    Sitting in chair and Call bell within reach    COMMUNICATION/EDUCATION:   The patients plan of care was discussed with: Physical therapist and Registered nurse.      Thank you for this referral.  Ely VIC Decker  Time Calculation: 25 mins

## 2022-09-26 NOTE — PROGRESS NOTES
Hospitalist Progress Note      Hospital summary: Natalie Page is a 78 y.o. female who presents with abdominal pain. Patient reports abdominal pain is started on Tuesday evening. Moderate pain. She thought it was a gas, took over-the-counter medication no improvement. Next days feeling the same went to emergency room, had CT scan of abdomen diagnosed with gallstone disease. She was advised needs cholecystectomy. Transferred here for further evaluation and treatment. Patient reports in 1996 was diagnosed with cirrhosis, had elevated liver enzyme but normalized. No history of EtOH. Upon arrival in the emergency room CT scan of abdomen showed gallbladder with wall thickness, likely cirrhosis and gallstone. Patient reports taking medication for asthma anxiety she has osteoarthritis taking Lortab. Taking verapamil 120 mg for rapid heart rate. 9/23/2022      Assessment/Plan:  Acute Cholecystitis s/p lap marta 9/25   - Pt presented with abd pain. Leukocytosis on poa - resolved   - CT abd: Cirrhosis is suspected. No hepatic mass, intrahepatic or ureteral dilation or extrahepatic bile duct dilation. At least 2 gallstones in the gallbladder neck, the larger measuring 18 mm. Gallbladder mildly distended  - US abd; Gallbladder distention with intraluminal calculi and a possible 17 mm calculus lodged in the gallbladder neck. Correlate for acute cholecystitis. Common bile duct dilatation without visualized choledocholithiasis. - appreciate Gen surgery input  - c/w IV Ceftriaxone and flagyl   -  Severely distended gallbladder. Severe inflammation and large liver lobe on left. Drain placed. - regular diet      Hx Liver cirrhosis - not on any meds   Asthma -stable , c/w brovana/ pulmicort   History of tachyarrhythmia - c/w home verapamil     Code status: Full  DVT prophylaxis: SCDs  Disposition: TBD.  Home likely tomorrow   ----------------------------------------------    CC: Abd pain     S: Patient is seen and examined at bedside. She feels better. Tolerated diet. Encouraged to ambulate   Discussed with nursing     Review of Systems:  A comprehensive review of systems was negative. O:  Visit Vitals  /74 (BP 1 Location: Right upper arm, BP Patient Position: At rest;Lying)   Pulse 83   Temp 98.1 °F (36.7 °C)   Resp 16   SpO2 94%       PHYSICAL EXAM:  Gen: NAD  HEENT: anicteric sclerae, normal conjunctiva, oropharynx clear, MM moist  Neck: supple, trachea midline, no adenopathy  Heart: RRR, no MRG, no JVD, no peripheral edema  Lungs: CTA b/l, non-labored respirations  Abd: soft, surgical site intact,RUQ drain, ND, BS+, no organomegaly  Extr: warm  Skin: dry, no rash  Neuro: CN II-XII grossly intact, normal speech, moves all extremities  Psych: normal mood, appropriate affect      Intake/Output Summary (Last 24 hours) at 9/26/2022 1358  Last data filed at 9/26/2022 0751  Gross per 24 hour   Intake --   Output 1310 ml   Net -1310 ml          Recent labs & imaging reviewed:  Recent Results (from the past 24 hour(s))   CULTURE, BLOOD    Collection Time: 09/25/22  4:05 PM    Specimen: Blood   Result Value Ref Range    Special Requests: NO SPECIAL REQUESTS      Culture result: NO GROWTH AFTER 17 HOURS     LACTIC ACID    Collection Time: 09/25/22  4:09 PM   Result Value Ref Range    Lactic acid 1.0 0.4 - 2.0 MMOL/L   METABOLIC PANEL, COMPREHENSIVE    Collection Time: 09/26/22 12:56 AM   Result Value Ref Range    Sodium 137 136 - 145 mmol/L    Potassium 4.1 3.5 - 5.1 mmol/L    Chloride 105 97 - 108 mmol/L    CO2 27 21 - 32 mmol/L    Anion gap 5 5 - 15 mmol/L    Glucose 152 (H) 65 - 100 mg/dL    BUN 11 6 - 20 MG/DL    Creatinine 0.69 0.55 - 1.02 MG/DL    BUN/Creatinine ratio 16 12 - 20      GFR est AA >60 >60 ml/min/1.73m2    GFR est non-AA >60 >60 ml/min/1.73m2    Calcium 7.8 (L) 8.5 - 10.1 MG/DL    Bilirubin, total 0.5 0.2 - 1.0 MG/DL    ALT (SGPT) 32 12 - 78 U/L    AST (SGOT) 35 15 - 37 U/L    Alk. phosphatase 84 45 - 117 U/L    Protein, total 6.2 (L) 6.4 - 8.2 g/dL    Albumin 2.5 (L) 3.5 - 5.0 g/dL    Globulin 3.7 2.0 - 4.0 g/dL    A-G Ratio 0.7 (L) 1.1 - 2.2     CBC WITH AUTOMATED DIFF    Collection Time: 09/26/22 12:56 AM   Result Value Ref Range    WBC 7.4 3.6 - 11.0 K/uL    RBC 3.55 (L) 3.80 - 5.20 M/uL    HGB 8.4 (L) 11.5 - 16.0 g/dL    HCT 28.3 (L) 35.0 - 47.0 %    MCV 79.7 (L) 80.0 - 99.0 FL    MCH 23.7 (L) 26.0 - 34.0 PG    MCHC 29.7 (L) 30.0 - 36.5 g/dL    RDW 18.2 (H) 11.5 - 14.5 %    PLATELET 496 261 - 587 K/uL    MPV 9.6 8.9 - 12.9 FL    NRBC 0.0 0  WBC    ABSOLUTE NRBC 0.00 0.00 - 0.01 K/uL    NEUTROPHILS 91 (H) 32 - 75 %    LYMPHOCYTES 5 (L) 12 - 49 %    MONOCYTES 3 (L) 5 - 13 %    EOSINOPHILS 0 0 - 7 %    BASOPHILS 0 0 - 1 %    IMMATURE GRANULOCYTES 1 (H) 0.0 - 0.5 %    ABS. NEUTROPHILS 6.7 1.8 - 8.0 K/UL    ABS. LYMPHOCYTES 0.4 (L) 0.8 - 3.5 K/UL    ABS. MONOCYTES 0.2 0.0 - 1.0 K/UL    ABS. EOSINOPHILS 0.0 0.0 - 0.4 K/UL    ABS. BASOPHILS 0.0 0.0 - 0.1 K/UL    ABS. IMM. GRANS. 0.1 (H) 0.00 - 0.04 K/UL    DF SMEAR SCANNED      RBC COMMENTS ANISOCYTOSIS  1+        RBC COMMENTS MICROCYTOSIS  1+        RBC COMMENTS HYPOCHROMIA  1+         Recent Labs     09/26/22 0056 09/25/22  0400   WBC 7.4 9.3   HGB 8.4* 9.5*   HCT 28.3* 32.5*    389       Recent Labs     09/26/22  0056 09/25/22  0400 09/24/22  0022    136 136   K 4.1 3.4* 3.3*    105 104   CO2 27 25 25   BUN 11 6 10   CREA 0.69 0.84 0.78   * 115* 89   CA 7.8* 8.4* 8.5       Recent Labs     09/26/22  0056 09/25/22  0400 09/24/22  0022   ALT 32 26 25   AP 84 90 88   TBILI 0.5 1.2* 1.3*   TP 6.2* 7.2 6.9   ALB 2.5* 2.6* 2.8*   GLOB 3.7 4.6* 4.1*       No results for input(s): INR, PTP, APTT, INREXT, INREXT in the last 72 hours. No results for input(s): FE, TIBC, PSAT, FERR in the last 72 hours. No results found for: FOL, RBCF   No results for input(s): PH, PCO2, PO2 in the last 72 hours.   No results for input(s): CPK, CKNDX, TROIQ in the last 72 hours.     No lab exists for component: CPKMB  Lab Results   Component Value Date/Time    Cholesterol, total 181 09/18/2020 08:59 AM    HDL Cholesterol 55 09/18/2020 08:59 AM    LDL, calculated 110.6 (H) 09/18/2020 08:59 AM    Triglyceride 77 09/18/2020 08:59 AM     No results found for: [facility]  Lab Results   Component Value Date/Time    Color YELLOW/STRAW 09/23/2022 05:25 AM    Appearance CLEAR 09/23/2022 05:25 AM    Specific gravity 1.025 09/23/2022 05:25 AM    pH (UA) 6.0 09/23/2022 05:25 AM    Protein TRACE (A) 09/23/2022 05:25 AM    Glucose Negative 09/23/2022 05:25 AM    Ketone Negative 09/23/2022 05:25 AM    Bilirubin Negative 09/23/2022 05:25 AM    Urobilinogen 1.0 09/23/2022 05:25 AM    Nitrites Negative 09/23/2022 05:25 AM    Leukocyte Esterase Negative 09/23/2022 05:25 AM    Epithelial cells MODERATE (A) 09/23/2022 05:25 AM    Bacteria 1+ (A) 09/23/2022 05:25 AM    WBC 0-4 09/23/2022 05:25 AM    RBC 5-10 09/23/2022 05:25 AM       Med list reviewed  Current Facility-Administered Medications   Medication Dose Route Frequency    HYDROmorphone (DILAUDID) injection 1 mg  1 mg IntraVENous Q2H PRN    oxyCODONE-acetaminophen (PERCOCET) 5-325 mg per tablet 1 Tablet  1 Tablet Oral Q4H PRN    verapamil ER (CALAN-SR) tablet 120 mg  120 mg Oral DAILY    cefTRIAXone (ROCEPHIN) 1 g in 0.9% sodium chloride 10 mL IV syringe  1 g IntraVENous Q24H    heparin (porcine) injection 5,000 Units  5,000 Units SubCUTAneous Q8H    metroNIDAZOLE (FLAGYL) IVPB premix 500 mg  500 mg IntraVENous Q12H    ALPRAZolam (XANAX) tablet 0.5 mg  0.5 mg Oral BID PRN    arformoteroL (BROVANA) neb solution 15 mcg  15 mcg Nebulization BID RT    And    budesonide (PULMICORT) 250 mcg/2ml nebulizer susp  500 mcg Nebulization BID RT       Care Plan discussed with:  Patient/Family, Nurse, and     Toby Iglesias MD  Internal Medicine  Date of Service: 9/26/2022

## 2022-09-26 NOTE — PROGRESS NOTES
Problem: Falls - Risk of  Goal: *Absence of Falls  Description: Document Hirsch Reason Fall Risk and appropriate interventions in the flowsheet.   Outcome: Progressing Towards Goal  Note: Fall Risk Interventions:            Medication Interventions: Evaluate medications/consider consulting pharmacy         History of Falls Interventions: Evaluate medications/consider consulting pharmacy

## 2022-09-26 NOTE — PROGRESS NOTES
Physician Progress Note      Dorina Grewal  CSN #:                  [id number]  :                       1955  ADMIT DATE:       2022 12:45 PM  [address] [place] DATE:  RESPONDING  PROVIDER #:        Ledy Arroyo MD          QUERY TEXT:    Patient admitted with cholecystitis with morbid obesity documented without corresponding BMI . If possible, please document in progress notes and discharge summary if you are evaluating and /or treating any of the following: The medical record reflects the following:  Risk Factors: elevated BMI  Clinical Indicators:  47.55 kg/m 2  Ht: 5' 2\" (1.575 m)  Wt: 117.9 kg (260 lb)  Treatment: regular diet  Thank you,  Abdullahi Mccabe RN, CDI, CRCR, CCDS  Certified Clinical Documentation Integrity  Specialist  [phone]  You can also contact me via [facility]. ? Specificity of obesity and morbid obesity should be reported based on physician documentation, as there are several published classifications and definitions? 3M MS-DRG Training Guide. CDC: https://cook-blankenship.info/. WHO: http://fran.biz/. NIH: LargeFood.be  Options provided:  -- Morbid obesity with BMI 47.55 kg/m 2  -- Severe obesity with BMI 47.55 kg/m 2  -- Other - I will add my own diagnosis  -- Disagree - Not applicable / Not valid  -- Disagree - Clinically unable to determine / Unknown  -- Refer to Clinical Documentation Reviewer    PROVIDER RESPONSE TEXT:    This patient has morbid obesity with BMI 47.55 kg/m 2. Query created by: Reynaldo Mendez on 2022 11:24 AM      QUERY TEXT:    Pt admitted with Cholecystitis.  Pt noted to have  a history of SVT If possible, please document in progress notes and discharge summary the present on admission status of SVT :    The medical record reflects the following:  Risk Factors: PMH SVT  Clinical Indicators:  PMH SVT (supraventricular tachycardia) (HCC)  H/P  Taking verapamil 120 mg for rapid heart rate. Treatment: verapamil    Thank you,  Matthew Oliva RN, CDI, Diaz, KATES  Certified Clinical Documentation Integrity  Specialist  [phone]  You can also contact me via [employer]. Options provided:  -- Yes, SVT  was present at the time of the order to admit to the hospital  -- No, SVTwas not present on admission and is only past medical history  -- Other - I will add my own diagnosis  -- Disagree - Not applicable / Not valid  -- Disagree - Clinically unable to determine / Unknown  -- Refer to Clinical Documentation Reviewer    PROVIDER RESPONSE TEXT:    Provider disagreed with this query.   as you documented PMH SVT    Query created by: Margo Waldron on 9/26/2022 11:31 AM      Electronically signed by:  Rachel Lock MD 9/26/2022 1:36 PM

## 2022-09-27 VITALS
HEART RATE: 80 BPM | DIASTOLIC BLOOD PRESSURE: 87 MMHG | OXYGEN SATURATION: 96 % | TEMPERATURE: 97.4 F | SYSTOLIC BLOOD PRESSURE: 144 MMHG | RESPIRATION RATE: 17 BRPM

## 2022-09-27 LAB
ALBUMIN SERPL-MCNC: 2.5 G/DL (ref 3.5–5)
ALBUMIN/GLOB SERPL: 0.6 {RATIO} (ref 1.1–2.2)
ALP SERPL-CCNC: 74 U/L (ref 45–117)
ALT SERPL-CCNC: 27 U/L (ref 12–78)
ANION GAP SERPL CALC-SCNC: 5 MMOL/L (ref 5–15)
AST SERPL-CCNC: 19 U/L (ref 15–37)
BASOPHILS # BLD: 0 K/UL (ref 0–0.1)
BASOPHILS NFR BLD: 0 % (ref 0–1)
BILIRUB SERPL-MCNC: 0.3 MG/DL (ref 0.2–1)
BUN SERPL-MCNC: 13 MG/DL (ref 6–20)
BUN/CREAT SERPL: 20 (ref 12–20)
CALCIUM SERPL-MCNC: 8.2 MG/DL (ref 8.5–10.1)
CHLORIDE SERPL-SCNC: 106 MMOL/L (ref 97–108)
CO2 SERPL-SCNC: 27 MMOL/L (ref 21–32)
CREAT SERPL-MCNC: 0.66 MG/DL (ref 0.55–1.02)
DIFFERENTIAL METHOD BLD: ABNORMAL
EOSINOPHIL # BLD: 0 K/UL (ref 0–0.4)
EOSINOPHIL NFR BLD: 0 % (ref 0–7)
ERYTHROCYTE [DISTWIDTH] IN BLOOD BY AUTOMATED COUNT: 18.6 % (ref 11.5–14.5)
GLOBULIN SER CALC-MCNC: 4.1 G/DL (ref 2–4)
GLUCOSE SERPL-MCNC: 102 MG/DL (ref 65–100)
HCT VFR BLD AUTO: 27.6 % (ref 35–47)
HGB BLD-MCNC: 8.1 G/DL (ref 11.5–16)
IMM GRANULOCYTES # BLD AUTO: 0.1 K/UL (ref 0–0.04)
IMM GRANULOCYTES NFR BLD AUTO: 1 % (ref 0–0.5)
LYMPHOCYTES # BLD: 1 K/UL (ref 0.8–3.5)
LYMPHOCYTES NFR BLD: 17 % (ref 12–49)
MCH RBC QN AUTO: 23.4 PG (ref 26–34)
MCHC RBC AUTO-ENTMCNC: 29.3 G/DL (ref 30–36.5)
MCV RBC AUTO: 79.8 FL (ref 80–99)
MONOCYTES # BLD: 0.3 K/UL (ref 0–1)
MONOCYTES NFR BLD: 5 % (ref 5–13)
NEUTS SEG # BLD: 4.9 K/UL (ref 1.8–8)
NEUTS SEG NFR BLD: 77 % (ref 32–75)
NRBC # BLD: 0.02 K/UL (ref 0–0.01)
NRBC BLD-RTO: 0.3 PER 100 WBC
PLATELET # BLD AUTO: 369 K/UL (ref 150–400)
PMV BLD AUTO: 9.7 FL (ref 8.9–12.9)
POTASSIUM SERPL-SCNC: 3.2 MMOL/L (ref 3.5–5.1)
PROT SERPL-MCNC: 6.6 G/DL (ref 6.4–8.2)
RBC # BLD AUTO: 3.46 M/UL (ref 3.8–5.2)
SODIUM SERPL-SCNC: 138 MMOL/L (ref 136–145)
WBC # BLD AUTO: 6.3 K/UL (ref 3.6–11)

## 2022-09-27 PROCEDURE — 74011250636 HC RX REV CODE- 250/636: Performed by: SURGERY

## 2022-09-27 PROCEDURE — 74011000250 HC RX REV CODE- 250: Performed by: SURGERY

## 2022-09-27 PROCEDURE — 74011250637 HC RX REV CODE- 250/637: Performed by: SURGERY

## 2022-09-27 PROCEDURE — 99024 POSTOP FOLLOW-UP VISIT: CPT | Performed by: SURGERY

## 2022-09-27 PROCEDURE — 85025 COMPLETE CBC W/AUTO DIFF WBC: CPT

## 2022-09-27 PROCEDURE — 80053 COMPREHEN METABOLIC PANEL: CPT

## 2022-09-27 PROCEDURE — 74011250637 HC RX REV CODE- 250/637: Performed by: INTERNAL MEDICINE

## 2022-09-27 PROCEDURE — 36415 COLL VENOUS BLD VENIPUNCTURE: CPT

## 2022-09-27 PROCEDURE — 94640 AIRWAY INHALATION TREATMENT: CPT

## 2022-09-27 RX ORDER — POTASSIUM CHLORIDE 750 MG/1
40 TABLET, FILM COATED, EXTENDED RELEASE ORAL
Status: COMPLETED | OUTPATIENT
Start: 2022-09-27 | End: 2022-09-27

## 2022-09-27 RX ADMIN — HEPARIN SODIUM 5000 UNITS: 5000 INJECTION INTRAVENOUS; SUBCUTANEOUS at 00:50

## 2022-09-27 RX ADMIN — ARFORMOTEROL TARTRATE 15 MCG: 15 SOLUTION RESPIRATORY (INHALATION) at 08:52

## 2022-09-27 RX ADMIN — POTASSIUM CHLORIDE 40 MEQ: 750 TABLET, FILM COATED, EXTENDED RELEASE ORAL at 08:39

## 2022-09-27 RX ADMIN — BUDESONIDE 500 MCG: 0.25 INHALANT RESPIRATORY (INHALATION) at 08:52

## 2022-09-27 RX ADMIN — VERAPAMIL HYDROCHLORIDE 120 MG: 120 TABLET, FILM COATED, EXTENDED RELEASE ORAL at 08:41

## 2022-09-27 RX ADMIN — METRONIDAZOLE 500 MG: 500 INJECTION, SOLUTION INTRAVENOUS at 01:04

## 2022-09-27 NOTE — PROGRESS NOTES
Problem: Falls - Risk of  Goal: *Absence of Falls  Description: Document Any Remy Fall Risk and appropriate interventions in the flowsheet.   Outcome: Progressing Towards Goal  Note: Fall Risk Interventions:            Medication Interventions: Teach patient to arise slowly         History of Falls Interventions: Evaluate medications/consider consulting pharmacy         Problem: Patient Education: Go to Patient Education Activity  Goal: Patient/Family Education  Outcome: Progressing Towards Goal     Problem: Infection - Risk of, Surgical Site Infection  Goal: *Absence of surgical site infection signs and symptoms  Outcome: Progressing Towards Goal

## 2022-09-27 NOTE — PROGRESS NOTES
Physician Progress Note      PATIENT:               Malcom Wilde  CSN #:                  [id number]  :                       1955  ADMIT DATE:       2022 12:45 PM  [address] [place] DATE:  RESPONDING  PROVIDER #:        Dannie Ludwig MD          QUERY TEXT:    Patient admitted with Cholecystitis , noted to have abnormal serum potassium level  If possible, please document in progress notes and discharge summary if you are evaluating and/or treating any of the following: The medical record reflects the following:  Risk Factors: abnormal serum potassium level  Clinical Indicators:  22 00:39  Potassium: 3.2 (L)  Treatment:  IV fluids w/ KCl, po supplemental potassium  Thank you,  Aiyana Key RN, CDI, CRCR, CCDS  Certified Clinical Documentation Integrity  Specialist  [phone]  You can also contact me via [employer]. Options provided:  -- hypokalemia  -- abnormal serum potassium level  not clinically significant  -- Other - I will add my own diagnosis  -- Disagree - Not applicable / Not valid  -- Disagree - Clinically unable to determine / Unknown  -- Refer to Clinical Documentation Reviewer    PROVIDER RESPONSE TEXT:    This patient has hypokalemia .     Query created by: Matti Ingram on 2022 10:09 AM      Electronically signed by:  Dannie Ludwig MD 2022 11:41 AM

## 2022-09-27 NOTE — PROGRESS NOTES
Transition of Care Plan  RUR- Low  8%    DISPOSITION: The disposition plan is home with family assistance and HH; The Attending has consented to the pt discharging without securing [facility]. A script for OP PT has been written and given to the pt. HH Denials:  [facility] is not in network with this payer-[employer] payers, thank you for referring your patients to  [facility]- we do not participate with [facility]  F/U with PCP/Specialist    Transport: Family       Transition of Care Plan:     The Plan for Transition of Care is related to the following treatment goals: [facility]    The Patient was provided with a choice of provider and agrees  with the discharge plan. Yes [x] No []    A Freedom of choice list was provided with basic dialogue that supports the patient's individualized plan of care/goals and shares the quality data associated with the providers.        Yes [x] No []    CM: [address] [place], [place] [address]e [place]. [place],   [phone]

## 2022-09-27 NOTE — DISCHARGE SUMMARY
Discharge Summary     Patient:  Edwin Branham       MRN: [id number]       YOB: 1955       Age: 78 y.o. Date of admission:  9/23/2022    Date of discharge:  9/27/2022    Primary care provider: Gala Martino MD    Admitting provider:  Jerie Denver, MD    Discharging provider:  Aamir Arenas [address] [phone]. If unavailable, call [phone] and ask the  to page the triage hospitalist.    Consultations  IP CONSULT TO GENERAL SURGERY    Procedures  Procedure(s):  CHOLECYSTECTOMY LAPAROSCOPIC ROBOTIC WITH CHOLANGIOGRAM    Discharge destination: Home. The patient is stable for discharge. Admission diagnosis  Cholecystitis [K81.9]    Current Discharge Medication List        CONTINUE these medications which have NOT CHANGED    Details   verapamil ER (CALAN-SR) 120 mg tablet Take 120 mg by mouth daily. fluticasone propionate (Flovent Diskus) 100 mcg/actuation dsdv Take  by inhalation. cyclobenzaprine (FLEXERIL) 10 mg tablet TAKE 1 TABLET BY MOUTH 3 TIMES A DAY AS NEEDED  Refills: 1      ALBUTEROL IN Take  by inhalation. FLUTICASONE/SALMETEROL (ADVAIR DISKUS IN) Take  by inhalation. ALPRAZOLAM (XANAX PO) Take  by mouth. ASPIRIN (ELIZABETH LOW STRENGTH PO) Take  by mouth. STOP taking these medications       predniSONE (DELTASONE) 10 mg tablet Comments:   Reason for Stopping:         HYDROCODONE/ACETAMINOPHEN (LORTAB PO) Comments:   Reason for Stopping:                Follow-up Information       Follow up With Specialties Details Why Contact Info    Gala Pinto MD Internal Medicine Physician   [address] [place] [address] [phone]      Maria A Hoang MD General Surgery, Bariatrics Follow up As needed [address] [place] [id number] [phone]              Final discharge diagnoses and brief hospital course  Edwin Branham is a 78 y.o. female who presents with abdominal pain. Patient reports abdominal pain is started on Tuesday evening. Moderate pain. She thought it was a gas, took over-the-counter medication no improvement. Next days feeling the same went to emergency room, had CT scan of abdomen diagnosed with gallstone disease. She was advised needs cholecystectomy. Transferred here for further evaluation and treatment. Patient reports in 1996 was diagnosed with cirrhosis, had elevated liver enzyme but normalized. No history of EtOH. Upon arrival in the emergency room CT scan of abdomen showed gallbladder with wall thickness, likely cirrhosis and gallstone. Patient reports taking medication for asthma anxiety she has osteoarthritis taking Lortab. Taking verapamil 120 mg for rapid heart rate. Acute Cholecystitis s/p lap marta 9/25   - Pt presented with abd pain. Leukocytosis on poa - resolved   - CT abd: Cirrhosis is suspected. No hepatic mass, intrahepatic or ureteral dilation or extrahepatic bile duct dilation. At least 2 gallstones in the gallbladder neck, the larger measuring 18 mm. Gallbladder mildly distended  - US abd; Gallbladder distention with intraluminal calculi and a possible 17 mm calculus lodged in the gallbladder neck. Correlate for acute cholecystitis. Common bile duct dilatation without visualized choledocholithiasis. - appreciate Gen surgery input  - on  IV Ceftriaxone and flagyl  while in hopsital   -  Severely distended gallbladder. Severe inflammation and large liver lobe on left. Drain placed. - tolerated regular diet   - drain removed on 9/27      Hx Liver cirrhosis - not on any meds , appears compensated   Asthma -stable , c/w brovana/ pulmicort   History of tachyarrhythmia - c/w home verapamil     Discharge recommendations  PCP in 1 week  Gen surg in 2 weeks   HH unable to arrange - outpt PT script given    Discharge plan discussed in detail with patient.  She understood and agreed Physical examination at discharge  Visit Vitals  BP (!) 144/87 (BP 1 Location: Right upper arm, BP Patient Position: Semi fowlers)   Pulse 80   Temp 97.4 °F (36.3 °C)   Resp 17   SpO2 96%     Gen: NAD  HEENT: anicteric sclerae, normal conjunctiva, oropharynx clear, MM moist  Neck: supple, trachea midline, no adenopathy  Heart: RRR, no MRG, no JVD, no peripheral edema  Lungs: CTA b/l, non-labored respirations  Abd: soft, surgical site intact, ND, BS+, no organomegaly  Extr: warm  Skin: dry, no rash  Neuro: CN II-XII grossly intact, normal speech, moves all extremities  Psych: normal mood, appropriate affect    Pertinent imaging studies:    Per EMR     Recent Labs     09/27/22 0039 09/26/22 0056 09/25/22  0400   WBC 6.3 7.4 9.3   HGB 8.1* 8.4* 9.5*   HCT 27.6* 28.3* 32.5*    325 389     Recent Labs     09/27/22 0039 09/26/22 0056 09/25/22  0400    137 136   K 3.2* 4.1 3.4*    105 105   CO2 27 27 25   BUN 13 11 6   CREA 0.66 0.69 0.84   * 152* 115*   CA 8.2* 7.8* 8.4*     Recent Labs     09/27/22 0039 09/26/22 0056 09/25/22  0400   AP 74 84 90   TP 6.6 6.2* 7.2   ALB 2.5* 2.5* 2.6*   GLOB 4.1* 3.7 4.6*     No results for input(s): INR, PTP, APTT, INREXT in the last 72 hours. No results for input(s): FE, TIBC, PSAT, FERR in the last 72 hours. No results for input(s): PH, PCO2, PO2 in the last 72 hours. No results for input(s): CPK, CKMB in the last 72 hours.     No lab exists for component: TROPONINI  No components found for: [place]    Chronic Diagnoses:    Problem List as of 9/27/2022 Date Reviewed: 9/25/2020            Codes Class Noted - Resolved    Cholecystitis ICD-10-CM: K81.9  ICD-9-CM: 575.10  9/23/2022 - Present        Obesity, morbid ([address] ICD-10-CM: E66.01  ICD-9-CM: 278.01  7/7/2020 - Present        History of colonic polyps ICD-10-CM: Z86.010  ICD-9-CM: V12.72  11/26/2013 - Present           Time spent on discharge related activities today greater than 30 minutes.       Signed:  Artie Moore MD                 Hospitalist, Internal Medicine      Cc: Josie Decker MD

## 2022-09-27 NOTE — PROGRESS NOTES
Problem: Falls - Risk of  Goal: *Absence of Falls  Description: Document Laretta Don Fall Risk and appropriate interventions in the flowsheet.   Outcome: Resolved/Met

## 2022-09-27 NOTE — DISCHARGE INSTRUCTIONS
Discharge Instructions for General Surgery Patients     Drain site will ooze for next few days. Okay to shower and cover after. Do not lift any objects weighing more than 15 pounds for 2 weeks. Do not do any housework including vacuuming, scrubbing or yardwork for 4 weeks. Do not drive or operate machinery while taking sedating or narcotic medications. Post operative pain is expected. Tylenol and motrin as needed. You may walk as desired and go up and down stairs as needed. Walking is encouraged. You may shower the day after surgery. Do not take tub baths, swim or use hot tubs for 2 weeks. Pat dry wounds after with a towel. Leave glue on wounds. It will fall off with time. Do not scrub around incisions. If redness develops around the glue okay to peel off in hot shower. Regular diet. Take Miralax once or twice a day as needed for constipation. Follow up with provider as scheduled. If you experience fever (greater than 101.5), chills, vomiting or redness or drainage at surgical site, please contact your surgeons office. If you have further questions or concerns, please call your surgeons office at [phone]. Please bring this form with you to show your primary care provider at your follow-up appointment. Primary care provider:   @[facility]@    Discharging provider:  David Vences MD    You have been admitted to the hospital with the following diagnoses:  Cholecystitis [K81.9]    FOLLOW-UP CARE RECOMMENDATIONS:    APPOINTMENTS:  Follow-up with primary care provider,  @[facility]@  -  Please call [unfilled] shortly after discharge to set up an appointment to be seen in  1 week    General surgery in 2-3 weeks as needed     FOLLOW-UP TESTS recommended: none     PENDING TEST RESULTS:  At the time of your discharge the following test results are still pending: none  Please make sure you review these results with your outpatient follow-up provider(s).     SYMPTOMS to watch for: chest pain, shortness of breath, fever, chills, nausea, vomiting, diarrhea, change in mentation, falling, weakness, bleeding. DIET/what to eat:  Regular Diet    ACTIVITY:  Activity as tolerated    What to do if new or unexpected symptoms occur? If you experience any of the above symptoms (or should other concerns or questions arise after discharge) please call your primary care physician. Return to the emergency room if you cannot get hold of your doctor. It is very important that you keep your follow-up appointment(s). Please bring discharge papers, medication list (and/or medication bottles) to your follow-up appointments for review by your outpatient provider(s). Please check the list of medications and be sure it includes every medication (even non-prescription medications) that your provider wants you to take. It is important that you take the medication exactly as they are prescribed. Keep your medication in the bottles provided by the pharmacist and keep a list of the medication names, dosages, and times to be taken in your wallet. Do not take other medications without consulting your doctor. If you have any questions about your medications or other instructions, please talk to your nurse or care provider before you leave the hospital.    I understand that if any problems occur once I am at home I am to contact my physician. These instructions were explained to me and I had the opportunity to ask questions.

## 2022-09-27 NOTE — PROGRESS NOTES
Surgery Progress Note    9/27/2022    Admit Date: 9/23/2022    CC: Abd pain    9/25 Robotic marta with IOC      Subjective:     No pain meds. Eating. Feeling well    Constitutional: No fever or chills  Neurologic: No headache  Eyes: No scleral icterus or irritated eyes  Nose: No nasal pain or drainage  Mouth: No oral lesions or sore throat  Cardiac: No palpations or chest pain  Pulmonary: No cough or shortness of breath  Gastrointestinal: Abd pain, No nausea, emesis, diarrhea, or constipation  Genitourinary: No dysuria  Musculoskeletal: No muscle or joint tenderness  Skin: No rashes or lesions  Psychiatric: No anxiety or depressed mood    Objective:   Visit Vitals  BP (!) 144/87 (BP 1 Location: Right upper arm, BP Patient Position: Semi fowlers)   Pulse 80   Temp 97.4 °F (36.3 °C)   Resp 17   SpO2 96%       General: No acute distress, conversant  Eyes: PERRLA, no scleral icterus  HENT: Normocephalic without oral lesions  Neck: Trachea midline without LAD  Cardiac: Normal pulse rate and rhythm  Pulmonary: Symmetric chest rise with normal effort  GI: Soft, wounds clean dry and intact, drain is bloody but no signs of bilious drainage. Skin: Warm without rash  Extremities: No edema or joint stiffness  Psych: Appropriate mood and affect    Labs, vital signs, and I/O reviewed.     Assessment:     77 y/o F with concern for acute cholecystitis post robotic marta with IOC    Plan:     PRN pain control  IVF  Reg diet  DC abx  Cont heparin  DC drain  Ambulate  Home today    Bianca Houston MD  Bariatric and General Surgeon  [facility]

## 2022-09-30 LAB
BACTERIA SPEC CULT: NORMAL
SERVICE CMNT-IMP: NORMAL

## 2022-10-10 ENCOUNTER — OFFICE VISIT (OUTPATIENT)
Dept: SURGERY | Age: 67
End: 2022-10-10
Payer: MEDICARE

## 2022-10-10 VITALS
WEIGHT: 265 LBS | DIASTOLIC BLOOD PRESSURE: 74 MMHG | TEMPERATURE: 98.2 F | OXYGEN SATURATION: 97 % | HEIGHT: 62 IN | RESPIRATION RATE: 19 BRPM | BODY MASS INDEX: 48.76 KG/M2 | HEART RATE: 101 BPM | SYSTOLIC BLOOD PRESSURE: 127 MMHG

## 2022-10-10 DIAGNOSIS — Z09 POSTOPERATIVE EXAMINATION: Primary | ICD-10-CM

## 2022-10-10 PROCEDURE — 99024 POSTOP FOLLOW-UP VISIT: CPT

## 2022-10-10 NOTE — PROGRESS NOTES
CC: Post Operative state    Subjective:     Dasha Sanchez is a 78 y.o. female presents for postop care 2 weeks s/p CHOLECYSTECTOMY LAPAROSCOPIC ROBOTIC WITH CHOLANGIOGRAM.    Patient doing well postoperatively. She states she is not having any pain and has not had any issues with her surgical sites. She is tolerating a regular diet without any nausea/vomiting. She did have 1 episode of nausea after eating fried soft shell crab yesterday, but nausea resolved on its own. She has not had any nausea since. Bowel movements are regular. The patient is voiding without difficulty. Reports normal yellow urine. Patient states she takes verapamil for fast heart rate. Heart rate normally in the low 100s. Patient denies fever, chest pain, or shortness of breath. Review of Systems:  A comprehensive review of systems was negative except for that written above      Ms. Colletta Rings has a reminder for a \"due or due soon\" health maintenance. I have asked that she contact her primary care provider for follow-up on this health maintenance. Objective:     Visit Vitals  /74 (BP 1 Location: Right arm, BP Patient Position: Sitting, BP Cuff Size: Large adult)   Pulse (!) 101   Temp 98.2 °F (36.8 °C) (Oral)   Resp 19   Ht 5' 2\" (1.575 m)   Wt 265 lb (120.2 kg)   SpO2 97%   BMI 48.47 kg/m²       General: alert, cooperative, no distress, appears stated age  CV: Regular rate and rhythm  Pulmonary: Lungs clear to auscultation   Abdomen:soft, bowel sounds active, non-tender  Lap sites:  healing well, no drainage, no erythema, no swelling, no dehiscence, incision well approximated. No signs of infection. Assessment:       ICD-10-CM ICD-9-CM    1. Postoperative examination  Z09 V67.00           Plan:   2+ weeks s/p CHOLECYSTECTOMY LAPAROSCOPIC ROBOTIC WITH CHOLANGIOGRAM  Reviewed pathology with patient   Gallbladder; cholecystectomy:        Acute cholecystitis with transmural ischemia and necrosis and   cholelithiasis. Reactive/reparative epithelial changes; no dysplasia identified. Wound care discussed. May submerge in water and continue to wash with mild soap and water. May moisturize surgical sites of the thyroid. We discussed the importance of proper diet post op- Caution with fatty / fried foods as can cause some stomach upset and diarrhea. Recommended journal as she introduces food back into her diet. May use heating pad and/or supportive wear for any abdominal pain/soreness. No lifting greater than 20 lbs x1 week then 30 lbs x1 week then may advance activity as tolerated. Rose Morales verbalized understanding and questions were answered to the best of my knowledge and ability. Instructed patient to call with any questions or concerns. Discharged from surgical care with prn follow up.         > 15 minutes were spent with patient with greater than 50% of that time spent face to face counseling    Shereen Pruett NP  [facility]   10/10/2022

## 2022-10-10 NOTE — PROGRESS NOTES
Identified pt with two pt identifiers (name and ). Reviewed chart in preparation for visit and have obtained necessary documentation. Humphrey Menon is a 78 y.o. female  Chief Complaint   Patient presents with    Post OP Follow Up     15 days s/p Lap Maria T      Visit Vitals  /74 (BP 1 Location: Right arm, BP Patient Position: Sitting, BP Cuff Size: Large adult)   Pulse (!) 101   Temp 98.2 °F (36.8 °C) (Oral)   Resp 19   Ht 5' 2\" (1.575 m)   Wt 265 lb (120.2 kg)   SpO2 97%   BMI 48.47 kg/m²       1. Have you been to the ER, urgent care clinic since your last visit? Hospitalized since your last visit? No    2. Have you seen or consulted any other health care providers outside of the [address] [place] [address] since your last visit? Include any pap smears or colon screening.  No

## 2022-12-12 ENCOUNTER — HOSPITAL ENCOUNTER (OUTPATIENT)
Dept: MAMMOGRAPHY | Age: 67
Discharge: HOME | End: 2022-12-12
Attending: NURSE PRACTITIONER
Payer: MEDICARE

## 2022-12-12 DIAGNOSIS — Z12.31 VISIT FOR SCREENING MAMMOGRAM: ICD-10-CM

## 2022-12-12 PROCEDURE — 77063 BREAST TOMOSYNTHESIS BI: CPT

## 2023-09-13 ENCOUNTER — TRANSCRIBE ORDERS (OUTPATIENT)
Facility: HOSPITAL | Age: 68
End: 2023-09-13

## 2023-09-13 DIAGNOSIS — Z78.0 ASYMPTOMATIC MENOPAUSAL STATE: Primary | ICD-10-CM

## 2023-09-13 DIAGNOSIS — Z12.31 ENCOUNTER FOR SCREENING MAMMOGRAM FOR MALIGNANT NEOPLASM OF BREAST: ICD-10-CM

## 2024-09-12 ENCOUNTER — TRANSCRIBE ORDERS (OUTPATIENT)
Facility: HOSPITAL | Age: 69
End: 2024-09-12

## 2024-09-12 DIAGNOSIS — Z78.0 ASYMPTOMATIC MENOPAUSAL STATE: Primary | ICD-10-CM

## 2024-09-25 ENCOUNTER — HOSPITAL ENCOUNTER (OUTPATIENT)
Facility: HOSPITAL | Age: 69
Discharge: HOME | End: 2024-09-28
Payer: COMMERCIAL

## 2024-09-25 DIAGNOSIS — Z12.31 ENCOUNTER FOR SCREENING MAMMOGRAM FOR MALIGNANT NEOPLASM OF BREAST: ICD-10-CM

## 2024-09-25 DIAGNOSIS — Z78.0 ASYMPTOMATIC MENOPAUSAL STATE: ICD-10-CM

## 2024-09-25 PROCEDURE — 77080 DXA BONE DENSITY AXIAL: CPT

## 2024-09-25 PROCEDURE — 77063 BREAST TOMOSYNTHESIS BI: CPT

## 2025-03-25 ENCOUNTER — TRANSCRIBE ORDERS (OUTPATIENT)
Facility: HOSPITAL | Age: 70
End: 2025-03-25

## 2025-03-25 DIAGNOSIS — R22.43 LOCALIZED SWELLING, MASS AND LUMP, LOWER LIMB, BILATERAL: Primary | ICD-10-CM

## 2025-03-26 ENCOUNTER — HOSPITAL ENCOUNTER (OUTPATIENT)
Facility: HOSPITAL | Age: 70
Discharge: HOME | End: 2025-03-29
Attending: INTERNAL MEDICINE
Payer: MEDICARE

## 2025-03-26 DIAGNOSIS — R22.43 LOCALIZED SWELLING, MASS AND LUMP, LOWER LIMB, BILATERAL: ICD-10-CM

## 2025-03-26 LAB
ECHO AO ROOT DIAM: 2.6 CM
ECHO AV PEAK GRADIENT: 6 MMHG
ECHO AV PEAK VELOCITY: 1.2 M/S
ECHO EST RA PRESSURE: 3 MMHG
ECHO LA DIAMETER: 3.6 CM
ECHO LA TO AORTIC ROOT RATIO: 1.38
ECHO LA VOL A-L A2C: 43 ML (ref 22–52)
ECHO LA VOL A-L A4C: 94 ML (ref 22–52)
ECHO LA VOL MOD A2C: 42 ML (ref 22–52)
ECHO LA VOL MOD A4C: 88 ML (ref 22–52)
ECHO LA VOLUME AREA LENGTH: 70 ML
ECHO LV E' LATERAL VELOCITY: 12.22 CM/S
ECHO LV E' SEPTAL VELOCITY: 10.6 CM/S
ECHO LV EDV A2C: 93 ML
ECHO LV EDV A4C: 88 ML
ECHO LV EDV BP: 92 ML (ref 56–104)
ECHO LV EF PHYSICIAN: 65 %
ECHO LV EJECTION FRACTION A2C: 64 %
ECHO LV EJECTION FRACTION A4C: 55 %
ECHO LV EJECTION FRACTION BIPLANE: 60 % (ref 55–100)
ECHO LV ESV A2C: 33 ML
ECHO LV ESV A4C: 39 ML
ECHO LV ESV BP: 37 ML (ref 19–49)
ECHO LV FRACTIONAL SHORTENING: 23 % (ref 28–44)
ECHO LV INTERNAL DIMENSION DIASTOLIC: 3.9 CM (ref 3.9–5.3)
ECHO LV INTERNAL DIMENSION SYSTOLIC: 3 CM
ECHO LV IVSD: 1.1 CM (ref 0.6–0.9)
ECHO LV MASS 2D: 140.1 G (ref 67–162)
ECHO LV POSTERIOR WALL DIASTOLIC: 1.1 CM (ref 0.6–0.9)
ECHO LV RELATIVE WALL THICKNESS RATIO: 0.56
ECHO LVOT AREA: 2.8 CM2
ECHO LVOT DIAM: 1.9 CM
ECHO MV A VELOCITY: 0.97 M/S
ECHO MV E DECELERATION TIME (DT): 200.2 MS
ECHO MV E VELOCITY: 1 M/S
ECHO MV E/A RATIO: 1.03
ECHO MV E/E' LATERAL: 8.18
ECHO MV E/E' RATIO (AVERAGED): 8.81
ECHO MV E/E' SEPTAL: 9.43
ECHO PULMONARY ARTERY SYSTOLIC PRESSURE (PASP): 24 MMHG
ECHO PV MAX VELOCITY: 1 M/S
ECHO PV PEAK GRADIENT: 4 MMHG
ECHO RA AREA 4C: 17.8 CM2
ECHO RIGHT VENTRICULAR SYSTOLIC PRESSURE (RVSP): 24 MMHG
ECHO RV BASAL DIMENSION: 4.2 CM
ECHO RV TAPSE: 2.1 CM (ref 1.7–?)
ECHO TV REGURGITANT MAX VELOCITY: 2.27 M/S
ECHO TV REGURGITANT PEAK GRADIENT: 21 MMHG

## 2025-03-26 PROCEDURE — 93306 TTE W/DOPPLER COMPLETE: CPT

## (undated) DEVICE — SUREFORM 45 RELOAD BLACK: Brand: SUREFORM

## (undated) DEVICE — NEEDLE SPNL 22GA L7IN BLK HUB S STL W/ QNCKE PNT W/OUT

## (undated) DEVICE — GARMENT,MEDLINE,DVT,INT,CALF,LG, GEN2: Brand: MEDLINE

## (undated) DEVICE — SUTURE DEV SZ 2-0 WND CLSR ABSRB GS-22 VLOC COVIDIEN VLOCM2145

## (undated) DEVICE — GENERAL LAPAROSCOPY - SMH: Brand: MEDLINE INDUSTRIES, INC.

## (undated) DEVICE — CLIP INT M L POLYMER LOK LIG HEM O LOK

## (undated) DEVICE — ARM DRAPE

## (undated) DEVICE — BAG SPEC REM 224ML W4XL6IN DIA10MM 1 HND GYN DISP ENDOPCH

## (undated) DEVICE — ELECTRO LUBE IS A SINGLE PATIENT USE DEVICE THAT IS INTENDED TO BE USED ON ELECTROSURGICAL ELECTRODES TO REDUCE STICKING.: Brand: KEY SURGICAL ELECTRO LUBE

## (undated) DEVICE — SUT ETHLN 2-0 18IN FS BLK --

## (undated) DEVICE — SEAL

## (undated) DEVICE — DRAIN SURG 19FR 100% SIL RADPQ RND CHN FULL FLUT

## (undated) DEVICE — VISUALIZATION SYSTEM: Brand: CLEARIFY

## (undated) DEVICE — SUCTION IRRIGATOR: Brand: ENDOWRIST

## (undated) DEVICE — SUREFORM 45: Brand: SUREFORM

## (undated) DEVICE — SYSTEM EVAC SMOKE LAPARSCOPIC

## (undated) DEVICE — TROCAR ENDOSCP L100MM DIA5MM BLDELSS STBL SL THRD OPT VW

## (undated) DEVICE — RESERVOIR,SUCTION,100CC,SILICONE: Brand: MEDLINE

## (undated) DEVICE — GLOVE SURG SZ 7 L12IN FNGR THK79MIL GRN LTX FREE

## (undated) DEVICE — SUTURE MCRYL SZ 4-0 L27IN ABSRB UD L19MM PS-2 1/2 CIR PRIM Y426H

## (undated) DEVICE — SUREFORM 45 RELOAD BLUE: Brand: SUREFORM

## (undated) DEVICE — COVER MPLR TIP CRV SCIS ACC DA VINCI

## (undated) DEVICE — PACK,BASIC,SIRUS,V: Brand: MEDLINE

## (undated) DEVICE — BLADELESS OBTURATOR: Brand: WECK VISTA

## (undated) DEVICE — DERMABOND SKIN ADH 0.7ML -- DERMABOND ADVANCED 12/BX

## (undated) DEVICE — GOWN,SIRUS,NONRNF,SETINSLV,XL,20/CS: Brand: MEDLINE

## (undated) DEVICE — STAPLER SHEATH: Brand: ENDOWRIST

## (undated) DEVICE — SEAL UNIV 5-8MM DISP BX/10 -- DA VINCI XI - SNGL USE

## (undated) DEVICE — C-ARM: Brand: UNBRANDED

## (undated) DEVICE — GLOVE SURG SZ 65 L12IN FNGR THK94MIL STD WHT LTX FREE

## (undated) DEVICE — HANDLE LT SNAP ON ULT DURABLE LENS FOR TRUMPF ALC DISPOSABLE

## (undated) DEVICE — SUTURE PDS + SZ 0 L27IN ABSRB VLT L36MM CT 1 1 2 CIR PDP340H